# Patient Record
Sex: MALE | Race: BLACK OR AFRICAN AMERICAN | Employment: OTHER | URBAN - METROPOLITAN AREA
[De-identification: names, ages, dates, MRNs, and addresses within clinical notes are randomized per-mention and may not be internally consistent; named-entity substitution may affect disease eponyms.]

---

## 2021-06-15 ENCOUNTER — HOSPITAL ENCOUNTER (OUTPATIENT)
Age: 56
Discharge: ANOTHER HOSPITAL | End: 2021-06-15
Payer: MEDICARE

## 2021-06-15 ENCOUNTER — HOSPITAL ENCOUNTER (EMERGENCY)
Facility: HOSPITAL | Age: 56
Discharge: HOME | End: 2021-06-15
Attending: EMERGENCY MEDICINE
Payer: MEDICARE

## 2021-06-15 VITALS
DIASTOLIC BLOOD PRESSURE: 95 MMHG | OXYGEN SATURATION: 98 % | WEIGHT: 200 LBS | HEART RATE: 106 BPM | TEMPERATURE: 98 F | RESPIRATION RATE: 20 BRPM | HEIGHT: 67 IN | SYSTOLIC BLOOD PRESSURE: 166 MMHG | BODY MASS INDEX: 31.39 KG/M2

## 2021-06-15 VITALS
DIASTOLIC BLOOD PRESSURE: 100 MMHG | SYSTOLIC BLOOD PRESSURE: 166 MMHG | HEART RATE: 104 BPM | OXYGEN SATURATION: 99 % | TEMPERATURE: 99 F | HEIGHT: 67 IN | RESPIRATION RATE: 18 BRPM

## 2021-06-15 DIAGNOSIS — H53.9 VISUAL CHANGES: Primary | ICD-10-CM

## 2021-06-15 DIAGNOSIS — I10 HYPERTENSION, UNSPECIFIED TYPE: ICD-10-CM

## 2021-06-15 DIAGNOSIS — I10 HYPERTENSION, UNSPECIFIED TYPE: Primary | ICD-10-CM

## 2021-06-15 DIAGNOSIS — H43.393 VITREOUS FLOATERS OF BOTH EYES: ICD-10-CM

## 2021-06-15 PROCEDURE — 80048 BASIC METABOLIC PNL TOTAL CA: CPT | Performed by: EMERGENCY MEDICINE

## 2021-06-15 PROCEDURE — 96374 THER/PROPH/DIAG INJ IV PUSH: CPT

## 2021-06-15 PROCEDURE — 99284 EMERGENCY DEPT VISIT MOD MDM: CPT

## 2021-06-15 PROCEDURE — 85025 COMPLETE CBC W/AUTO DIFF WBC: CPT | Performed by: EMERGENCY MEDICINE

## 2021-06-15 PROCEDURE — 99205 OFFICE O/P NEW HI 60 MIN: CPT | Performed by: NURSE PRACTITIONER

## 2021-06-15 RX ORDER — HYDRALAZINE HYDROCHLORIDE 20 MG/ML
10 INJECTION INTRAMUSCULAR; INTRAVENOUS ONCE
Status: COMPLETED | OUTPATIENT
Start: 2021-06-15 | End: 2021-06-15

## 2021-06-15 RX ORDER — AMLODIPINE BESYLATE 5 MG/1
5 TABLET ORAL DAILY
Qty: 30 TABLET | Refills: 0 | Status: SHIPPED | OUTPATIENT
Start: 2021-06-15 | End: 2021-06-24

## 2021-06-15 NOTE — ED QUICK NOTES
Patient notes floaters in bilateral eyes. States he has had them intermittently over the past year, but that as of Sunday they were back. Denies any numbness or tingling. No other complaints.

## 2021-06-15 NOTE — ED QUICK NOTES
Patient safe to DC home per MD. Myers Lookout to dress self. DC teaching done, instructions reviewed with patient including when and how to follow up with healthcare providers and when to seek emergency care. The patient verbalizes understanding.  Peripheral IV disc

## 2021-06-15 NOTE — ED INITIAL ASSESSMENT (HPI)
\"seeing floaters\" intermittently in bilateral eyes since Sunday. Denies headache. Ambulating with steady gait.

## 2021-06-15 NOTE — ED PROVIDER NOTES
Patient Seen in: [facility] AND [facility] Emergency Department    History   Patient presents with: Eye Visual Problem    Stated Complaint: visual changes/HTN    HPI    Patient complains of floaters since January in both eyes. No visual changes.   No numbness ti edema  NEURO: alert and oiented *3, 2-12 intact, no focal deficit noted  SKIN: good skin turgor, no  rashes  PSYCH: calm, cooperative,    Differential includes:htn with floaters no evidence of end organ damage    ED Course     Labs Reviewed   Jasmina Salts 5:09 PM    START taking these medications    amLODIPine Besylate 5 MG Oral Tab  Take 1 tablet (5 mg total) by mouth daily. , Normal, Disp-30 tablet, R-0                           Disposition and Plan     Clinical Impression:  Hypertension, unspecified type

## 2021-06-15 NOTE — ED PROVIDER NOTES
Patient Seen in: [facility]      History   Patient presents with:   Eye Visual Problem    Stated Complaint: runny eyes/vision/runny nose    HPI/Subjective:   HPI    This is a 61 y/o male with a history of developmental delay who presents with reactive to light. Cardiovascular:      Rate and Rhythm: Normal rate and regular rhythm. Pulses: Normal pulses. Heart sounds: Normal heart sounds. Pulmonary:      Effort: Pulmonary effort is normal.      Breath sounds: Normal breath sounds.

## 2021-06-24 ENCOUNTER — OFFICE VISIT (OUTPATIENT)
Dept: FAMILY MEDICINE CLINIC | Facility: CLINIC | Age: 56
End: 2021-06-24
Payer: MEDICARE

## 2021-06-24 VITALS
SYSTOLIC BLOOD PRESSURE: 144 MMHG | WEIGHT: 206.63 LBS | HEART RATE: 92 BPM | HEIGHT: 67 IN | DIASTOLIC BLOOD PRESSURE: 84 MMHG | BODY MASS INDEX: 32.43 KG/M2

## 2021-06-24 DIAGNOSIS — Z12.5 SCREENING FOR MALIGNANT NEOPLASM OF PROSTATE: ICD-10-CM

## 2021-06-24 DIAGNOSIS — I10 ESSENTIAL HYPERTENSION: Primary | ICD-10-CM

## 2021-06-24 DIAGNOSIS — I10 ESSENTIAL HYPERTENSION: ICD-10-CM

## 2021-06-24 DIAGNOSIS — Z00.00 ADULT GENERAL MEDICAL EXAMINATION: ICD-10-CM

## 2021-06-24 DIAGNOSIS — H43.393 VITREOUS FLOATERS OF BOTH EYES: ICD-10-CM

## 2021-06-24 PROCEDURE — 99214 OFFICE O/P EST MOD 30 MIN: CPT | Performed by: NURSE PRACTITIONER

## 2021-06-24 RX ORDER — AMLODIPINE BESYLATE 10 MG/1
10 TABLET ORAL DAILY
Qty: 30 TABLET | Refills: 1 | Status: SHIPPED | OUTPATIENT
Start: 2021-06-24 | End: 2021-06-24

## 2021-06-24 RX ORDER — AMLODIPINE BESYLATE 10 MG/1
TABLET ORAL
Qty: 90 TABLET | Refills: 0 | Status: SHIPPED | OUTPATIENT
Start: 2021-06-24 | End: 2021-09-01

## 2021-06-24 NOTE — PATIENT INSTRUCTIONS
-Increase Amlodipine to 10mg daily.  You can take 2 of your 5-mg tablets at the same time daily until you run out, then start the new prescription of 10mg tablets that I sent to your pharmacy.  -Fasting labs: see attached lab sheet for available locatio cause serious health problems. It raises your risk for heart attack, stroke, and heart failure. But you can do many things to manage your blood pressure.  In general, if you have high blood pressure, keeping your blood pressure below 130/80 mmHg may help pr is an important part of any program to lower blood pressure. Learn ways to relax. These include meditation, yoga, and biofeedback. · If medicines were prescribed, take them exactly as directed.  Missing doses may cause your blood pressure to get out of con memory, simply take the monitor with you to your next appointment. · Call your provider if you have several high readings. Don't be frightened by a single high reading, but if you get several high readings, check in with your healthcare provider.   · Note: these problems. It's important to know the appropriate blood pressure range and remember to check your blood pressure regularly. Doing so can save your life. Blood pressure measurements are given as 2 numbers. Systolic blood pressure is the upper number. Generally, a good weight loss goal is to lose 10% of your body weight in a year. · Limit snacks and sweets. · Get regular exercise. Get up and get active   · Find activities you enjoy that can be done alone or with friends or family.  Such activities m

## 2021-06-24 NOTE — PROGRESS NOTES
Chief Complaint:   Patient presents with: Follow - Up: ER follow-up hypertension      HPI:   This is a 60year old male coming in for follow-up after emergency room visit on 6/15/21 for visual changes and high blood pressure.  No prior diagnosis of hyperte 150.0 - 450.0 10(3)uL    Neutrophil Absolute Prelim 8.22 (H) 1.50 - 7.70 x10 (3) uL    Neutrophil Absolute 8.22 (H) 1.50 - 7.70 x10(3) uL    Lymphocyte Absolute 1.55 1.00 - 4.00 x10(3) uL    Monocyte Absolute 0.74 0.10 - 1.00 x10(3) uL    Eosinophil Absolu following:    Height as of this encounter: 5' 7\" (1.702 m). Weight as of this encounter: 206 lb 9.6 oz (93.7 kg). Vital signs reviewed. Appears stated age, well groomed, in no acute distress.   Physical Exam:  GEN:  Patient is alert, awake and oriented

## 2021-07-07 ENCOUNTER — TELEPHONE (OUTPATIENT)
Dept: FAMILY MEDICINE CLINIC | Facility: CLINIC | Age: 56
End: 2021-07-07

## 2021-07-07 DIAGNOSIS — H43.393 VITREOUS FLOATERS OF BOTH EYES: Primary | ICD-10-CM

## 2021-07-07 NOTE — TELEPHONE ENCOUNTER
Yvette Cea calling for pt, requesting a referral to Dr. Dariel Fried, Ophthalmology. Referral pended; inform Rhys De Jesus when complete at [phone].

## 2021-07-07 NOTE — TELEPHONE ENCOUNTER
Informed Lance Anderson referral is not approved at this stage only ordered and may require approval from the health insurance provider. She verbalizes understanding.

## 2021-07-08 ENCOUNTER — TELEPHONE (OUTPATIENT)
Dept: FAMILY MEDICINE CLINIC | Facility: CLINIC | Age: 56
End: 2021-07-08

## 2021-07-08 ENCOUNTER — OFFICE VISIT (OUTPATIENT)
Dept: FAMILY MEDICINE CLINIC | Facility: CLINIC | Age: 56
End: 2021-07-08
Payer: MEDICARE

## 2021-07-08 VITALS
OXYGEN SATURATION: 98 % | SYSTOLIC BLOOD PRESSURE: 136 MMHG | HEIGHT: 67 IN | HEART RATE: 99 BPM | BODY MASS INDEX: 32.02 KG/M2 | DIASTOLIC BLOOD PRESSURE: 84 MMHG | WEIGHT: 204 LBS

## 2021-07-08 DIAGNOSIS — H43.393 VITREOUS FLOATERS OF BOTH EYES: ICD-10-CM

## 2021-07-08 DIAGNOSIS — I10 ESSENTIAL HYPERTENSION: Primary | ICD-10-CM

## 2021-07-08 PROCEDURE — 99213 OFFICE O/P EST LOW 20 MIN: CPT | Performed by: NURSE PRACTITIONER

## 2021-07-08 RX ORDER — BIMATOPROST 0.01 %
1 DROPS OPHTHALMIC (EYE) EVERY EVENING
COMMUNITY
End: 2021-08-31

## 2021-07-08 NOTE — PROGRESS NOTES
Chief Complaint:   Patient presents with:  Blood Pressure: recheck       HPI:   This is a 60year old male coming in for blood pressure follow-up. At last visit on 6/24/21 patient's blood pressure was 144/84.  Amlodipine 5 mg daily was increased to 10 mg da 7.70 x10(3) uL    Lymphocyte Absolute 1.55 1.00 - 4.00 x10(3) uL    Monocyte Absolute 0.74 0.10 - 1.00 x10(3) uL    Eosinophil Absolute 0.03 0.00 - 0.70 x10(3) uL    Basophil Absolute 0.04 0.00 - 0.20 x10(3) uL    Immature Granulocyte Absolute 0.04 0.00 - encounter: 204 lb (92.5 kg). Vital signs reviewed. Appears stated age, well groomed, in no acute distress. Physical Exam:  GEN:  Patient is alert, awake and oriented, well developed, well nourished.   SKIN: No rashes, no skin lesion, no bruising, good tur

## 2021-07-08 NOTE — TELEPHONE ENCOUNTER
Referral for Dr. Yosi Egan (retinal specialist) was authorized that was placed 6/24/21. Patient had an appointment this morning that he had to cancel because this provider's office did not receive referral from [facility].  Can we please fax the referral author

## 2021-07-12 ENCOUNTER — TELEPHONE (OUTPATIENT)
Dept: FAMILY MEDICINE CLINIC | Facility: CLINIC | Age: 56
End: 2021-07-12

## 2021-07-22 ENCOUNTER — OFFICE VISIT (OUTPATIENT)
Dept: FAMILY MEDICINE CLINIC | Facility: CLINIC | Age: 56
End: 2021-07-22
Payer: MEDICARE

## 2021-07-22 VITALS
DIASTOLIC BLOOD PRESSURE: 82 MMHG | HEIGHT: 67 IN | BODY MASS INDEX: 32.08 KG/M2 | OXYGEN SATURATION: 97 % | WEIGHT: 204.38 LBS | HEART RATE: 100 BPM | SYSTOLIC BLOOD PRESSURE: 118 MMHG

## 2021-07-22 DIAGNOSIS — R51.9 PRESSURE IN HEAD: Primary | ICD-10-CM

## 2021-07-22 DIAGNOSIS — I10 ESSENTIAL HYPERTENSION: ICD-10-CM

## 2021-07-22 PROCEDURE — 99213 OFFICE O/P EST LOW 20 MIN: CPT | Performed by: NURSE PRACTITIONER

## 2021-07-22 RX ORDER — LATANOPROST 50 UG/ML
SOLUTION/ DROPS OPHTHALMIC
COMMUNITY
Start: 2021-07-15

## 2021-07-22 RX ORDER — DORZOLAMIDE HYDROCHLORIDE AND TIMOLOL MALEATE 20; 5 MG/ML; MG/ML
1 SOLUTION/ DROPS OPHTHALMIC 2 TIMES DAILY
COMMUNITY
Start: 2021-07-12

## 2021-07-22 NOTE — PROGRESS NOTES
Chief Complaint:   Patient presents with:  Lightheadedness      HPI:   This is a 60year old male coming in with sister for complaint of intermittent \"strange sensation\" in forehead/bilateral temporal area that started on Monday.  He has difficulty descri Range    Hold Gold Auto Resulted    CBC W/ DIFFERENTIAL   Result Value Ref Range    WBC 10.6 4.0 - 11.0 x10(3) uL    RBC 5.41 4.30 - 5.70 x10(6)uL    HGB 16.7 13.0 - 17.5 g/dL    HCT 50.7 39.0 - 53.0 %    MCV 93.7 80.0 - 100.0 fL    MCH 30.9 26.0 - 34.0 pg Solution Place 1 drop into the right eye every evening.      • AMLODIPINE BESYLATE 10 MG Oral Tab TAKE 1 TABLET(10 MG) BY MOUTH DAILY 90 tablet 0      Counseling given: Not Answered       REVIEW OF SYSTEMS:   CONSTITUTIONAL:  Denies unusual weight gain/loss unclear, has difficulty describing symptoms.  -Symptoms not reproducible on exam.   -Instructed patient to call opthalmologist to report his symptoms to see if a follow-up or change in therapy is indicated.   -S/S to go to ER with discussed including severe

## 2021-07-23 ENCOUNTER — APPOINTMENT (OUTPATIENT)
Dept: MRI IMAGING | Facility: HOSPITAL | Age: 56
End: 2021-07-23
Attending: EMERGENCY MEDICINE
Payer: MEDICARE

## 2021-07-23 ENCOUNTER — HOSPITAL ENCOUNTER (EMERGENCY)
Facility: HOSPITAL | Age: 56
Discharge: HOME | End: 2021-07-23
Attending: EMERGENCY MEDICINE
Payer: MEDICARE

## 2021-07-23 VITALS
SYSTOLIC BLOOD PRESSURE: 144 MMHG | BODY MASS INDEX: 32 KG/M2 | TEMPERATURE: 98 F | DIASTOLIC BLOOD PRESSURE: 83 MMHG | OXYGEN SATURATION: 98 % | HEART RATE: 90 BPM | RESPIRATION RATE: 16 BRPM | WEIGHT: 204 LBS

## 2021-07-23 DIAGNOSIS — R42 VERTIGO: Primary | ICD-10-CM

## 2021-07-23 PROCEDURE — 99284 EMERGENCY DEPT VISIT MOD MDM: CPT

## 2021-07-23 PROCEDURE — 70551 MRI BRAIN STEM W/O DYE: CPT | Performed by: EMERGENCY MEDICINE

## 2021-07-23 RX ORDER — MECLIZINE HYDROCHLORIDE 25 MG/1
25 TABLET ORAL 3 TIMES DAILY PRN
Qty: 20 TABLET | Refills: 0 | Status: SHIPPED | OUTPATIENT
Start: 2021-07-23 | End: 2021-12-09

## 2021-07-23 NOTE — ED QUICK NOTES
Weston Schulz is here for evaluation of episodes of frontal head pressure intermittently for past five days. Denies pain, denies visual disturbance, denies focal weakness.   He has a history of glaucoma and recently prescribed lumigan and combigan eye drops by op

## 2021-07-24 NOTE — ED PROVIDER NOTES
Patient Seen in: [facility] [place] AND [facility] Emergency Department      History   No chief complaint on file. Stated Complaint: Headache    HPI/Subjective:   HPI    64-year-old male presents for evaluation of headache and vertigo.   Patient reports over the past rhythm. Heart sounds: Normal heart sounds. Pulmonary:      Effort: Pulmonary effort is normal. No respiratory distress. Breath sounds: Normal breath sounds. Abdominal:      General: Bowel sounds are normal.      Palpations: Abdomen is soft. [address]  [phone]    Schedule an appointment as soon as possible for a visit in 3 days  For follow up          Medications Prescribed:  Discharge Medication List as of 7/23/2021  2:42 PM    START taking these medications    Meclizine HCl 25 MG Oral Tab

## 2021-07-28 ENCOUNTER — OFFICE VISIT (OUTPATIENT)
Dept: FAMILY MEDICINE CLINIC | Facility: CLINIC | Age: 56
End: 2021-07-28
Payer: MEDICARE

## 2021-07-28 VITALS
DIASTOLIC BLOOD PRESSURE: 90 MMHG | SYSTOLIC BLOOD PRESSURE: 130 MMHG | HEIGHT: 67 IN | BODY MASS INDEX: 31.89 KG/M2 | WEIGHT: 203.19 LBS

## 2021-07-28 DIAGNOSIS — R51.9 PRESSURE IN HEAD: Primary | ICD-10-CM

## 2021-07-28 DIAGNOSIS — R42 LIGHTHEADEDNESS: ICD-10-CM

## 2021-07-28 DIAGNOSIS — I10 ESSENTIAL HYPERTENSION: ICD-10-CM

## 2021-07-28 DIAGNOSIS — H53.9 VISION CHANGES: ICD-10-CM

## 2021-07-28 PROCEDURE — 99213 OFFICE O/P EST LOW 20 MIN: CPT | Performed by: NURSE PRACTITIONER

## 2021-07-28 NOTE — PROGRESS NOTES
Chief Complaint:   Patient presents with:  ER F/U: having dizziness and headache in temporal region  Vertigo      HPI:   This is a 60year old male coming in for ER follow-up.  Patient was in the ER on 7/23/21 with complaint of a \"strange sensation\" in fo reviewed on smear    RAINBOW DRAW LAVENDER   Result Value Ref Range    Hold Lavender Auto Resulted    RAINBOW DRAW LIGHT GREEN   Result Value Ref Range    Hold Lt Green Auto Resulted    RAINBOW DRAW GOLD   Result Value Ref Range    Hold Gold Auto Resulted Dorzolamide HCl-Timolol Mal 22.3-6.8 MG/ML Ophthalmic Solution Place 1 drop into both eyes 2 (two) times daily.        • latanoprost 0.005 % Ophthalmic Solution INSTILL 1 DROP BOTH EYES EVERY DAY AT BEDTIME AS DIRECTED     • Brimonidine Tartrate-Timolol 0.2 erythema or exudate. Mouth:  No oral lesions, good dentition. SKIN: No rashes, no skin lesion, no bruising, good turgor. HEART:  Regular rate and rhythm, no murmurs, rubs or gallops. LUNGS: Clear to auscultation bilterally, no rales/rhonchi/wheezing.

## 2021-08-21 ENCOUNTER — IMMUNIZATION (OUTPATIENT)
Dept: LAB | Facility: HOSPITAL | Age: 56
End: 2021-08-21
Attending: EMERGENCY MEDICINE
Payer: MEDICARE

## 2021-08-21 DIAGNOSIS — Z23 NEED FOR VACCINATION: Primary | ICD-10-CM

## 2021-08-21 PROCEDURE — 0001A SARSCOV2 VAC 30MCG/0.3ML IM: CPT

## 2021-08-25 ENCOUNTER — HOSPITAL ENCOUNTER (EMERGENCY)
Facility: HOSPITAL | Age: 56
Discharge: HOME | End: 2021-08-25
Attending: EMERGENCY MEDICINE
Payer: MEDICARE

## 2021-08-25 ENCOUNTER — HOSPITAL ENCOUNTER (OUTPATIENT)
Dept: ULTRASOUND IMAGING | Facility: HOSPITAL | Age: 56
Discharge: HOME | End: 2021-08-25
Attending: NURSE PRACTITIONER
Payer: MEDICARE

## 2021-08-25 VITALS
BODY MASS INDEX: 32 KG/M2 | OXYGEN SATURATION: 99 % | WEIGHT: 202 LBS | TEMPERATURE: 98 F | HEART RATE: 86 BPM | DIASTOLIC BLOOD PRESSURE: 87 MMHG | SYSTOLIC BLOOD PRESSURE: 135 MMHG | RESPIRATION RATE: 18 BRPM

## 2021-08-25 DIAGNOSIS — R51.9 PRESSURE IN HEAD: ICD-10-CM

## 2021-08-25 DIAGNOSIS — H53.9 VISION CHANGES: ICD-10-CM

## 2021-08-25 DIAGNOSIS — R42 LIGHTHEADEDNESS: ICD-10-CM

## 2021-08-25 DIAGNOSIS — I10 ESSENTIAL HYPERTENSION: ICD-10-CM

## 2021-08-25 DIAGNOSIS — R42 DIZZINESS: Primary | ICD-10-CM

## 2021-08-25 PROCEDURE — 93880 EXTRACRANIAL BILAT STUDY: CPT | Performed by: NURSE PRACTITIONER

## 2021-08-25 PROCEDURE — 99282 EMERGENCY DEPT VISIT SF MDM: CPT

## 2021-08-25 NOTE — ED PROVIDER NOTES
Patient Seen in: [facility] AND [facility] Emergency Department      History   Patient presents with:  Headache    Stated Complaint: off balance, HA x 1 month, sent by [facility]- unable to complete d/t new onset of sym*    HPI/Subjective:   HPI    Patient is a 56-year nystagmus, TMs clear  Neck: supple, non tender, no meningeal signs  CV: RRR, no murmurs  Resp: CTAB, no wheezes or retractions  Extremities: FROM of all extremities, no cyanosis/clubbing/edema  Neuro: CN intact, normal speech, normal gait, negative romberg

## 2021-08-31 ENCOUNTER — TELEPHONE (OUTPATIENT)
Dept: NEUROLOGY | Facility: CLINIC | Age: 56
End: 2021-08-31

## 2021-08-31 ENCOUNTER — OFFICE VISIT (OUTPATIENT)
Dept: NEUROLOGY | Facility: CLINIC | Age: 56
End: 2021-08-31
Payer: MEDICARE

## 2021-08-31 VITALS
HEIGHT: 69 IN | HEART RATE: 84 BPM | WEIGHT: 204 LBS | SYSTOLIC BLOOD PRESSURE: 126 MMHG | BODY MASS INDEX: 30.21 KG/M2 | DIASTOLIC BLOOD PRESSURE: 86 MMHG

## 2021-08-31 DIAGNOSIS — M54.2 CERVICALGIA: ICD-10-CM

## 2021-08-31 DIAGNOSIS — R26.81 GAIT INSTABILITY: ICD-10-CM

## 2021-08-31 DIAGNOSIS — Z82.49 FAMILY HISTORY OF ANEURYSM OF BLOOD VESSEL OF BRAIN: ICD-10-CM

## 2021-08-31 DIAGNOSIS — G44.201 ACUTE INTRACTABLE TENSION-TYPE HEADACHE: Primary | ICD-10-CM

## 2021-08-31 PROCEDURE — 99203 OFFICE O/P NEW LOW 30 MIN: CPT | Performed by: OTHER

## 2021-08-31 NOTE — PROGRESS NOTES
Leigh Ann Dub [address]  [address] [facility], [address] [place] [address]  [phone]              Date: August 31, 2021  Patient Name: Alexandro Fairbanks   MRN: [id number]    Reason for Evaluation: head pressure    HPI:     Alexandro Fairbanks is a 60 times daily as needed for Dizziness. , Disp: 20 tablet, Rfl: 0  Dorzolamide HCl-Timolol Mal 22.3-6.8 MG/ML Ophthalmic Solution, Place 1 drop into both eyes 2 (two) times daily.   , Disp: , Rfl:   latanoprost 0.005 % Ophthalmic Solution, INSTILL 1 DROP BOTH E CN VIII: auditory acuity intact to bedside testing  CN IX/CN X: normal palate elevation  CN XI: normal strength of trapezius and SCM muscles bilaterally. CN XII: tongue protrudes in the midline, no atrophy or fasciculations.     Motor Exam:   Muscle Bulk: Absolute      0.00 - 0.70 x10(3) uL 0.03   Basophils Absolute      0.00 - 0.20 x10(3) uL 0.04   Immature Granulocyte Absolute      0.00 - 1.00 x10(3) uL 0.04   Neutrophils %      % 77.3   Lymphocytes %      % 14.6   Monocytes %      % 7.0   Eosinophils % motion degradation. Risks vs benefit of CT imaging was discussed with patient and his sister.   His neurological examination is significant for slightly reduced fund of knowledge but otherwise nonfocal, consistent with his history of developmental delay an

## 2021-08-31 NOTE — TELEPHONE ENCOUNTER
Minda for authorization of approval for CTA BRAIN (XBF=[id number])    Authorization # [id number] effective 08/31/21 to 09/29/21. Minda for authorization of approval for MRI C-spine wo cpt code 55021.    Authorization # [id number]

## 2021-08-31 NOTE — PATIENT INSTRUCTIONS
-CT cervical spine and CTA brain   -Physical therapy consult for neck pain and headaches   -Follow up in 1 month or sooner if needed.     -If you develop sudden onset loss of vision, double vision, room spinning/world spinning sensation, inability to speak

## 2021-09-01 ENCOUNTER — OFFICE VISIT (OUTPATIENT)
Dept: FAMILY MEDICINE CLINIC | Facility: CLINIC | Age: 56
End: 2021-09-01
Payer: MEDICARE

## 2021-09-01 VITALS
DIASTOLIC BLOOD PRESSURE: 74 MMHG | OXYGEN SATURATION: 97 % | HEIGHT: 69 IN | HEART RATE: 86 BPM | BODY MASS INDEX: 29.65 KG/M2 | WEIGHT: 200.19 LBS | SYSTOLIC BLOOD PRESSURE: 130 MMHG

## 2021-09-01 DIAGNOSIS — I10 ESSENTIAL HYPERTENSION: ICD-10-CM

## 2021-09-01 DIAGNOSIS — R20.2 NUMBNESS AND TINGLING IN RIGHT HAND: ICD-10-CM

## 2021-09-01 DIAGNOSIS — R20.0 NUMBNESS AND TINGLING IN RIGHT HAND: ICD-10-CM

## 2021-09-01 DIAGNOSIS — R51.9 PRESSURE IN HEAD: Primary | ICD-10-CM

## 2021-09-01 DIAGNOSIS — M54.2 CERVICALGIA: ICD-10-CM

## 2021-09-01 PROCEDURE — 99213 OFFICE O/P EST LOW 20 MIN: CPT | Performed by: NURSE PRACTITIONER

## 2021-09-01 RX ORDER — AMLODIPINE BESYLATE 10 MG/1
10 TABLET ORAL DAILY
Qty: 90 TABLET | Refills: 1 | Status: SHIPPED | OUTPATIENT
Start: 2021-09-01 | End: 2021-12-09

## 2021-09-01 NOTE — PATIENT INSTRUCTIONS
Quadruped Arm-Reach Exercise       Do this exercise on your hands and knees. Keep your knees under your hips and your hands under your shoulders. Keep your spine in a neutral position (not arched or sagging). Keep your ears in line with your shoulders.  H elbows toward your buttocks. Hold for up to 5 seconds, then return to starting position.   · Repeat 3 times, or as directed by your healthcare provider or physical therapist.  Shayla Butler last reviewed this educational content on 7/1/2020  © [id number] [employer] professional's instructions. Neck Exercises: Active Neck Rotation    To start, lie on your back, knees bent and feet flat on the floor. Keep your ears, shoulders, and hips aligned, but don’t press your lower back to the floor.  Rest your hands on you Neck Exercises: Head Lifts    Do this exercise on your hands and knees. Keep your knees under your hips and your hands under your shoulders. Keep your spine in a neutral position (not arched or sagging). Keep your ears in line with your shoulders.  Daysi Jalloh back. Place your right palm on the top of your head. · Gently pull your head forward and down until you feel a stretch in the muscles in the back of your neck. Don’t force the motion. · Hold for 20 seconds, then return to starting position. Switch arms.

## 2021-09-01 NOTE — PROGRESS NOTES
Chief Complaint:   Patient presents with: Follow - Up: pressure in head   Numbness: R hand - shocked by phone       HPI:   This is a 60year old male coming in for follow-up. Presents with sister.  Patient continues to have pressure in head, episodic, occu RAINBOW DRAW LAVENDER   Result Value Ref Range    Hold Lavender Auto Resulted    RAINBOW DRAW LIGHT GREEN   Result Value Ref Range    Hold Lt Green Auto Resulted    RAINBOW DRAW GOLD   Result Value Ref Range    Hold Gold Auto Resulted    CBC W/ DIFFEREN Tab Take 1 tablet (25 mg total) by mouth 3 (three) times daily as needed for Dizziness. 20 tablet 0   • Dorzolamide HCl-Timolol Mal 22.3-6.8 MG/ML Ophthalmic Solution Place 1 drop into both eyes 2 (two) times daily.        • latanoprost 0.005 % Ophthalmic S and tone, sensory intact. ASSESSMENT AND PLAN:   1. Pressure in head  -Unclear, followed by neuro and planned for CT head/neck and PT. S/S to go to ER with reviewed. 2. Essential hypertension  -BP stable, will CPM and refill amlodipine.    - amLODIPi

## 2021-09-10 ENCOUNTER — HOSPITAL ENCOUNTER (OUTPATIENT)
Dept: CT IMAGING | Facility: HOSPITAL | Age: 56
Discharge: HOME | End: 2021-09-10
Attending: Other
Payer: MEDICARE

## 2021-09-10 DIAGNOSIS — G44.201 ACUTE INTRACTABLE TENSION-TYPE HEADACHE: ICD-10-CM

## 2021-09-10 DIAGNOSIS — M54.2 CERVICALGIA: ICD-10-CM

## 2021-09-10 LAB — CREAT BLD-MCNC: 1.2 MG/DL

## 2021-09-10 PROCEDURE — 70496 CT ANGIOGRAPHY HEAD: CPT | Performed by: OTHER

## 2021-09-10 PROCEDURE — 82565 ASSAY OF CREATININE: CPT

## 2021-09-10 PROCEDURE — 72125 CT NECK SPINE W/O DYE: CPT | Performed by: OTHER

## 2021-09-11 ENCOUNTER — IMMUNIZATION (OUTPATIENT)
Dept: LAB | Facility: HOSPITAL | Age: 56
End: 2021-09-11
Attending: EMERGENCY MEDICINE
Payer: MEDICARE

## 2021-09-11 DIAGNOSIS — Z23 NEED FOR VACCINATION: Primary | ICD-10-CM

## 2021-09-11 PROCEDURE — 0002A SARSCOV2 VAC 30MCG/0.3ML IM: CPT

## 2021-09-13 ENCOUNTER — TELEPHONE (OUTPATIENT)
Dept: NEUROLOGY | Facility: CLINIC | Age: 56
End: 2021-09-13

## 2021-09-14 ENCOUNTER — TELEPHONE (OUTPATIENT)
Dept: PHYSICAL THERAPY | Facility: HOSPITAL | Age: 56
End: 2021-09-14

## 2021-09-14 NOTE — TELEPHONE ENCOUNTER
Spoke to patient's sister and reviewed Dr. Doreen Kennedy message below. She was understanding and will call PT today to get his scheduled. He is scheduled for a follow-up on 10/5/21. She was very thankful for the call.

## 2021-09-14 NOTE — TELEPHONE ENCOUNTER
, Roberto Calvin, DO  You; Solis Uriarte Nurse 19 minutes ago (7:35 AM)     SS    Thanks!  Please let him know that the blood vessels of his brain look normal. 2 of them are slightly smaller than expected but this is a normal variant, meaning it was something he

## 2021-09-14 NOTE — TELEPHONE ENCOUNTER
I also wanted to mention that the severe narrowing on the right C4 foramen means he likely has a pinched nerve here, which can lead to not only neck pain but arm symptoms such as numbness or weakness.  If he is experiencing this, physical therapy can still

## 2021-09-15 ENCOUNTER — OFFICE VISIT (OUTPATIENT)
Dept: PHYSICAL THERAPY | Facility: HOSPITAL | Age: 56
End: 2021-09-15
Attending: Other
Payer: MEDICARE

## 2021-09-15 DIAGNOSIS — M54.2 CERVICALGIA: ICD-10-CM

## 2021-09-15 PROCEDURE — 97161 PT EVAL LOW COMPLEX 20 MIN: CPT

## 2021-09-15 PROCEDURE — 97110 THERAPEUTIC EXERCISES: CPT

## 2021-09-15 NOTE — PROGRESS NOTES
SPINE EVALUATION:   Referring Physician: Dr. Jone Berger  Diagnosis: Cervicalgia (M54.2)     Date of Service: 9/15/2021     PATIENT SUMMARY   Melia Leone is a 60year old male who presents to therapy today with complaints of bilateral (R>L) head discomfort, Had been hypertensive at some physician visits. Has recently started going back to doctor. History of glaucoma. Developmental delay. Patient states that he feels he is healthy. Walks frequently for exercise as he does not drive.        No past surgical denotes performed with pain)  UE/Scapular   Shoulder Elevation (C4): R 4/5, L 5/5  Shoulder ABD (C5): R 5/5, L 5/5  Biceps (C6): R 5/5, L 5/5  Wrist ext (C6): R 4+/5, L 5/5  Triceps (C7): R 5/5, L 5/5  Wrist Flex (C7): R 4+/5, L 5/5  Digit Flex (C8): R 4+/ compliant with comprehensive HEP to maintain progress achieved in PT       Frequency / Duration: Patient will be seen for 1-2 x/week or a total of 10 visits over a 90 day period.  Treatment will include: Manual Therapy, Neuromuscular Re-education, Therapeut

## 2021-09-16 ENCOUNTER — OFFICE VISIT (OUTPATIENT)
Dept: PHYSICAL THERAPY | Facility: HOSPITAL | Age: 56
End: 2021-09-16
Attending: Other
Payer: MEDICARE

## 2021-09-16 DIAGNOSIS — M54.2 CERVICALGIA: ICD-10-CM

## 2021-09-16 PROCEDURE — 97140 MANUAL THERAPY 1/> REGIONS: CPT

## 2021-09-16 PROCEDURE — 97110 THERAPEUTIC EXERCISES: CPT

## 2021-09-16 NOTE — PROGRESS NOTES
Dx: Cervicalgia (M54.2)         Insurance (Authorized # of Visits):  Harshad Caller 2/10           Authorizing Physician: Dr. Leonardo Ashby  Next MD visit: none scheduled  Fall Risk: standard         Precautions: n/a             Subjective: Neck stretches are going oka instruction  Date: 9/16/2021  TX#: 2/10 Date:                 TX#: 3/ Date:                 TX#: 4/ Date:                 TX#: 5/ Date:    Tx#: 6/   MT:   LAT   PPIVM rotation to open R side low cervical   Rib 1 inferior mobilization  15 min       TE:   Yara

## 2021-09-17 ENCOUNTER — TELEPHONE (OUTPATIENT)
Dept: FAMILY MEDICINE CLINIC | Facility: CLINIC | Age: 56
End: 2021-09-17

## 2021-09-20 ENCOUNTER — OFFICE VISIT (OUTPATIENT)
Dept: PHYSICAL THERAPY | Facility: HOSPITAL | Age: 56
End: 2021-09-20
Attending: Other
Payer: MEDICARE

## 2021-09-20 PROCEDURE — 97140 MANUAL THERAPY 1/> REGIONS: CPT

## 2021-09-20 PROCEDURE — 97110 THERAPEUTIC EXERCISES: CPT

## 2021-09-20 NOTE — PROGRESS NOTES
Dx: Cervicalgia (M54.2)         Insurance (Authorized # of Visits):  Brent Muhammad 3/10           Authorizing Physician: Dr. Kika Castillo  Next MD visit: none scheduled  Fall Risk: standard         Precautions: n/a             Subjective:   Currently feeling head and Neuromuscular Re-education, Therapeutic Activities, Therapeutic Exercise and Home Exercise Program instruction  Date: 9/16/2021  TX#: 2/10 Date:  9/20/2021                TX#: 3/10 Date:                 TX#: 4/ Date:                 TX#: 5/ Date:    Tx#: 6/

## 2021-09-30 ENCOUNTER — OFFICE VISIT (OUTPATIENT)
Dept: PHYSICAL THERAPY | Facility: HOSPITAL | Age: 56
End: 2021-09-30
Attending: Other
Payer: MEDICARE

## 2021-09-30 PROCEDURE — 97140 MANUAL THERAPY 1/> REGIONS: CPT

## 2021-09-30 PROCEDURE — 97110 THERAPEUTIC EXERCISES: CPT

## 2021-09-30 NOTE — PROGRESS NOTES
Dx: Cervicalgia (M54.2)         Insurance (Authorized # of Visits):  Brent Muhammad 4/10           Authorizing Physician: Dr. Kika Castillo  Next MD visit: none scheduled  Fall Risk: standard         Precautions: developmental delay/communication with assistance from s independent and compliant with comprehensive HEP to maintain progress achieved in PT       Plan:   Patient will be seen for 1-2 x/week or a total of 10 visits over a 90 day period.  Treatment will include: Manual Therapy, Neuromuscular Re-education, Therape

## 2021-10-05 ENCOUNTER — OFFICE VISIT (OUTPATIENT)
Dept: NEUROLOGY | Facility: CLINIC | Age: 56
End: 2021-10-05
Payer: MEDICARE

## 2021-10-05 VITALS
BODY MASS INDEX: 31.71 KG/M2 | HEIGHT: 67 IN | DIASTOLIC BLOOD PRESSURE: 80 MMHG | SYSTOLIC BLOOD PRESSURE: 118 MMHG | WEIGHT: 202 LBS | HEART RATE: 70 BPM

## 2021-10-05 DIAGNOSIS — M54.12 CERVICAL RADICULOPATHY: Primary | ICD-10-CM

## 2021-10-05 DIAGNOSIS — H34.12 CENTRAL RETINAL ARTERY OCCLUSION OF LEFT EYE: ICD-10-CM

## 2021-10-05 PROCEDURE — 99214 OFFICE O/P EST MOD 30 MIN: CPT | Performed by: OTHER

## 2021-10-05 RX ORDER — ASPIRIN 81 MG/1
81 TABLET, CHEWABLE ORAL DAILY
Qty: 90 TABLET | Refills: 0 | Status: SHIPPED | OUTPATIENT
Start: 2021-10-05 | End: 2022-01-03

## 2021-10-05 NOTE — PROGRESS NOTES
Leigh Ann Dub 37  [address] [facility], [address] [place] [address]  [phone]          Established  Follow Up Visit       Date: October 5, 2021  Patient Name: Angelique Richards   MRN: [id number]  Primary physician: [address] EXAM:     /80 (BP Location: Right arm, Patient Position: Sitting, Cuff Size: adult)   Pulse 70   Ht 67\"   Wt 202 lb (91.6 kg)   BMI 31.64 kg/m²   General appearance: Well appearing, and in no acute distress  Skin: skin color, texture normal.  No jeni central retinal artery occlusion  --Aspirin 81 mg daily for secondary stroke prevention, risk vs benefit discussed including bleeding risk   --Follow up LDL, Hemoglobin A1C     Discussed indication, administration, dose, and side effects with patient of an

## 2021-10-07 ENCOUNTER — OFFICE VISIT (OUTPATIENT)
Dept: PHYSICAL THERAPY | Facility: HOSPITAL | Age: 56
End: 2021-10-07
Attending: Other
Payer: MEDICARE

## 2021-10-07 PROCEDURE — 97140 MANUAL THERAPY 1/> REGIONS: CPT

## 2021-10-07 PROCEDURE — 97110 THERAPEUTIC EXERCISES: CPT

## 2021-10-07 NOTE — PROGRESS NOTES
Dx: Cervicalgia (M54.2)         Insurance (Authorized # of Visits):  Juana Rosa 5/10           Authorizing Physician: Dr. Cris Matta  Next MD visit: none scheduled  Fall Risk: standard         Precautions: developmental delay/communication with assistance from s upright posturing and decreased pain    · Pt will be independent and compliant with comprehensive HEP to maintain progress achieved in PT       Plan:   Patient will be seen for 1-2 x/week or a total of 10 visits over a 90 day period.  Treatment will include

## 2021-10-13 ENCOUNTER — OFFICE VISIT (OUTPATIENT)
Dept: PHYSICAL THERAPY | Facility: HOSPITAL | Age: 56
End: 2021-10-13
Attending: Other
Payer: MEDICARE

## 2021-10-13 PROCEDURE — 97140 MANUAL THERAPY 1/> REGIONS: CPT

## 2021-10-13 PROCEDURE — 97110 THERAPEUTIC EXERCISES: CPT

## 2021-10-13 NOTE — PROGRESS NOTES
Dx: Cervicalgia (M54.2)         Insurance (Authorized # of Visits):  Heike Goldstein 6/10           Authorizing Physician: Dr. Camila Brown  Next MD visit: none scheduled  Fall Risk: standard         Precautions: developmental delay/communication with assistance from s posturing and decreased pain    · Pt will report decreased frequency of headaches to <3x/week  · Pt will demonstrate improved cervical intrinsic strength to allow improved cervical stabilization with overhead reaching   · Pt will improve postural strength rolls, sideflexion AROM stretch  TE:   DNF chin tuck x15   Band horizontal abduction YTB 2x10  D2 flexion/extension 3x10 bilat, initial cue to min shoulder hike, incorporate cervical rotation  Shoulder flexion YTB for ER 3x10      TE:   Prone scap retracti

## 2021-10-20 ENCOUNTER — APPOINTMENT (OUTPATIENT)
Dept: PHYSICAL THERAPY | Facility: HOSPITAL | Age: 56
End: 2021-10-20
Attending: Other
Payer: MEDICARE

## 2021-10-21 ENCOUNTER — APPOINTMENT (OUTPATIENT)
Dept: PHYSICAL THERAPY | Facility: HOSPITAL | Age: 56
End: 2021-10-21
Attending: NURSE PRACTITIONER
Payer: MEDICARE

## 2021-10-26 ENCOUNTER — APPOINTMENT (OUTPATIENT)
Dept: PHYSICAL THERAPY | Facility: HOSPITAL | Age: 56
End: 2021-10-26
Attending: NURSE PRACTITIONER
Payer: MEDICARE

## 2021-10-27 ENCOUNTER — OFFICE VISIT (OUTPATIENT)
Dept: PHYSICAL THERAPY | Facility: HOSPITAL | Age: 56
End: 2021-10-27
Attending: Other
Payer: MEDICARE

## 2021-10-27 PROCEDURE — 97110 THERAPEUTIC EXERCISES: CPT

## 2021-10-27 NOTE — PROGRESS NOTES
Dx: Cervicalgia (M54.2)         Insurance (Authorized # of Visits):  Thomas Canseco 6/10           Authorizing Physician: Dr. Arleth Moreno  Next MD visit: none scheduled  Fall Risk: standard         Precautions: developmental delay/communication with assistance from s turning head to check blind spot while driving  · Pt will have improved thoracic PA mobility to WNL to improve cervical ROM as well as promote upright posturing and decreased pain    · Pt will report decreased frequency of headaches to <3x/week  · Pt will bilateral  Cervical AROM rotation x20 bilateral   Shoulder flexion stretch supine x15, 5s holds   Rows GTB 3x10   D2 flexion/extension YTB 2x10 bilateral, tactile cue to min shoulder hike  Neck rolls, sideflexion AROM stretch  TE:   DNF chin tuck x15   Ban

## 2021-10-28 ENCOUNTER — APPOINTMENT (OUTPATIENT)
Dept: PHYSICAL THERAPY | Facility: HOSPITAL | Age: 56
End: 2021-10-28
Attending: NURSE PRACTITIONER
Payer: MEDICARE

## 2021-11-05 ENCOUNTER — TELEPHONE (OUTPATIENT)
Dept: FAMILY MEDICINE CLINIC | Facility: CLINIC | Age: 56
End: 2021-11-05

## 2021-11-23 ENCOUNTER — OFFICE VISIT (OUTPATIENT)
Dept: PHYSICAL THERAPY | Facility: HOSPITAL | Age: 56
End: 2021-11-23
Attending: Other
Payer: MEDICARE

## 2021-11-23 PROCEDURE — 97110 THERAPEUTIC EXERCISES: CPT

## 2021-11-23 NOTE — PROGRESS NOTES
Bryanna  Pt has attended 8 visits in Physical Therapy.      Dx: Cervicalgia (M54.2)         Insurance (Authorized # of Visits):  Heike Goldstein 8/10           Authorizing Physician: Dr. Camila Brown  Next MD visit: none scheduled  Fall Risk: standard Assessment:  Difficult to quantify changes in QOL and function, as well as changes in symptom presentation regarding patient's head and neck symptoms from communication deficits.  However, they still persist to a degree that the patient states being u improved cervical intrinsic strength to allow improved cervical stabilization with overhead reaching . poor  · Pt will improve postural strength (mid/low trap) to promote improved upright posturing and decreased pain  .  met  · Pt will be independent and co x15  Retraction overpressure seated x15  Flexion weighted 7# 2x10  Rows blue tb  TE:   Chin tuck with manual resistance x15  4 finger head turns x10  Overhead press 8# 2x10  Horiz abd GTB 3x10   Blue TB rows 3x10    TE:   Test and reassessment   Discussion

## 2021-12-01 ENCOUNTER — HOSPITAL ENCOUNTER (EMERGENCY)
Facility: HOSPITAL | Age: 56
Discharge: HOME | End: 2021-12-01
Attending: EMERGENCY MEDICINE
Payer: MEDICARE

## 2021-12-01 VITALS
WEIGHT: 196 LBS | OXYGEN SATURATION: 97 % | TEMPERATURE: 98 F | DIASTOLIC BLOOD PRESSURE: 79 MMHG | HEIGHT: 66 IN | HEART RATE: 78 BPM | RESPIRATION RATE: 18 BRPM | SYSTOLIC BLOOD PRESSURE: 135 MMHG | BODY MASS INDEX: 31.5 KG/M2

## 2021-12-01 DIAGNOSIS — R10.9 ABDOMINAL PAIN OF UNKNOWN ETIOLOGY: Primary | ICD-10-CM

## 2021-12-01 PROCEDURE — 81003 URINALYSIS AUTO W/O SCOPE: CPT | Performed by: EMERGENCY MEDICINE

## 2021-12-01 PROCEDURE — 36415 COLL VENOUS BLD VENIPUNCTURE: CPT

## 2021-12-01 PROCEDURE — 93005 ELECTROCARDIOGRAM TRACING: CPT

## 2021-12-01 PROCEDURE — 80053 COMPREHEN METABOLIC PANEL: CPT | Performed by: EMERGENCY MEDICINE

## 2021-12-01 PROCEDURE — 85025 COMPLETE CBC W/AUTO DIFF WBC: CPT | Performed by: EMERGENCY MEDICINE

## 2021-12-01 PROCEDURE — 93010 ELECTROCARDIOGRAM REPORT: CPT | Performed by: EMERGENCY MEDICINE

## 2021-12-01 PROCEDURE — 99283 EMERGENCY DEPT VISIT LOW MDM: CPT

## 2021-12-01 NOTE — ED NOTES
Received pt a/ox4, clear speech, nad, no resp distress, ambulatory with steady gait  Here with c/o epigastric abd pain x 2 days  Denies n/v/d. Denies fevers or cough. Last bowel movement on Monday    Placed on all continuous monitors.    20G piv placed to L

## 2021-12-02 NOTE — ED PROVIDER NOTES
Patient Seen in: [facility] Emergency Department    History   Patient presents with:  Abdominal Pain      HPI    Patient presents to the ED complaining of epigastric abdominal pain for the past several days.   He states pain comes and goes, denies any equipment used during my examination was cleaned with super sani-cloth germicidal wipes following the exam.     Physical Exam  Vitals and nursing note reviewed. Constitutional:       General: He is not in acute distress.      Appearance: He is well-de noted on EKG Report. Rate: Normal  Rhythm: Sinus Rhythm  Reading: Normal intervals, normal EKG             Imaging Results Available and Reviewed while in ED: No results found.   ED Medications Administered: Medications - No data to display      MDM      1

## 2021-12-06 ENCOUNTER — LAB ENCOUNTER (OUTPATIENT)
Dept: LAB | Facility: HOSPITAL | Age: 56
End: 2021-12-06
Attending: NURSE PRACTITIONER
Payer: MEDICARE

## 2021-12-06 DIAGNOSIS — Z12.5 SCREENING FOR MALIGNANT NEOPLASM OF PROSTATE: ICD-10-CM

## 2021-12-06 DIAGNOSIS — Z00.00 ADULT GENERAL MEDICAL EXAMINATION: ICD-10-CM

## 2021-12-06 PROCEDURE — 36415 COLL VENOUS BLD VENIPUNCTURE: CPT

## 2021-12-06 PROCEDURE — 84443 ASSAY THYROID STIM HORMONE: CPT

## 2021-12-06 PROCEDURE — 83036 HEMOGLOBIN GLYCOSYLATED A1C: CPT

## 2021-12-06 PROCEDURE — 80061 LIPID PANEL: CPT

## 2021-12-09 ENCOUNTER — OFFICE VISIT (OUTPATIENT)
Dept: FAMILY MEDICINE CLINIC | Facility: CLINIC | Age: 56
End: 2021-12-09
Payer: MEDICARE

## 2021-12-09 VITALS
WEIGHT: 196.19 LBS | RESPIRATION RATE: 18 BRPM | SYSTOLIC BLOOD PRESSURE: 110 MMHG | BODY MASS INDEX: 31.53 KG/M2 | HEART RATE: 76 BPM | DIASTOLIC BLOOD PRESSURE: 80 MMHG | HEIGHT: 66 IN | OXYGEN SATURATION: 98 %

## 2021-12-09 DIAGNOSIS — R97.20 ELEVATED PSA: ICD-10-CM

## 2021-12-09 DIAGNOSIS — I10 ESSENTIAL HYPERTENSION: ICD-10-CM

## 2021-12-09 DIAGNOSIS — R51.9 PRESSURE IN HEAD: ICD-10-CM

## 2021-12-09 DIAGNOSIS — Z53.20 COLON CANCER SCREENING DECLINED: ICD-10-CM

## 2021-12-09 DIAGNOSIS — E78.5 DYSLIPIDEMIA: ICD-10-CM

## 2021-12-09 DIAGNOSIS — Z91.81 RISK FOR FALLS: ICD-10-CM

## 2021-12-09 DIAGNOSIS — M54.2 CERVICALGIA: ICD-10-CM

## 2021-12-09 DIAGNOSIS — Z28.21 INFLUENZA VACCINATION DECLINED BY PATIENT: ICD-10-CM

## 2021-12-09 DIAGNOSIS — H40.9 GLAUCOMA OF BOTH EYES, UNSPECIFIED GLAUCOMA TYPE: ICD-10-CM

## 2021-12-09 DIAGNOSIS — Z00.00 ADULT GENERAL MEDICAL EXAMINATION: Primary | ICD-10-CM

## 2021-12-09 DIAGNOSIS — K59.01 SLOW TRANSIT CONSTIPATION: ICD-10-CM

## 2021-12-09 PROCEDURE — G0439 PPPS, SUBSEQ VISIT: HCPCS | Performed by: NURSE PRACTITIONER

## 2021-12-09 RX ORDER — AMLODIPINE BESYLATE 10 MG/1
10 TABLET ORAL DAILY
Qty: 90 TABLET | Refills: 1 | Status: SHIPPED | OUTPATIENT
Start: 2021-12-09

## 2021-12-09 NOTE — PROGRESS NOTES
HPI:   Denys Hernandez is a 60year old male who presents for a MA (Medicare Advantage) Supervisit (Once per calendar year). Feels well overall.  Seeing neurology regularly for pressure in head, CT showed disc disease in neck so he has been doing PT, which h Driving difficulties based on screening of functional status. Driving: Does not drive   He has difficulties Managing Money/Bills based on screening of functional status.    Managing money/bills: Need some help  He has difficulties Shopping for Groceries b 12/06/2021    CREATSERUM 1.18 06/15/2021     (H) 06/15/2021        CBC  (most recent labs)   Lab Results   Component Value Date    WBC 7.2 12/01/2021    HGB 16.1 12/01/2021    .0 12/01/2021        ALLERGIES:   He has No Known Allergies.     CU anxiety    EXAM:   /80 (BP Location: Left arm, Patient Position: Sitting, Cuff Size: adult)   Pulse 76   Resp 18   Ht 5' 6\" (1.676 m)   Wt 196 lb 3.2 oz (89 kg)   SpO2 98%   BMI 31.67 kg/m²   Estimated body mass index is 31.67 kg/m² as calculated fr Ears:  Normal TM's and external ear canals, both ears   Nose: Nares normal, septum midline, mucosa normal, no drainage or sinus tenderness   Throat: Lips, mucosa, and tongue normal; teeth and gums normal   Neck: Supple, symmetrical, trachea midline, no a use Miralax 1 capful daily x 1 week, if having daily BMs can decrease to 1/2 capful daily x 1 week then PRN. Discussed need to have good habits to maintain bowel regularity. Follow-up if no improvement.     Elevated PSA  -     UROLOGY - INTERNAL  -No urinar Results   Component Value Date     (H) 06/15/2021        Cardiovascular Disease Screening    Lipid Panel  Cholesterol  Lipoprotein (HDL)  Triglycerides Covered every 5 years for all Medicare beneficiaries without apparent signs or symptoms of cardio benefits -  No recommendations at this time

## 2021-12-09 NOTE — PATIENT INSTRUCTIONS
Constipation:  1. Drink plenty of water every day  2. Get regular exercise (walking counts!)  3. Eat fiber in your diet: Fruits, vegetables, whole grains  4. Start an over-the-counter fiber supplement twice daily.  You can take Benefiber or Metamucil mixe pressure  Your healthcare provider may recommend that you get more physical activity if you haven't been active. Your provider may recommend that you get moderate to vigorous physical activity for at least 40 minutes each day.  You should do this for at [place] [id number] cholesterol down. But you may need some extra help from medicine. If your doctor prescribes medicine, be sure to take it exactly as directed. Remember:   · Tell your healthcare provider about all other medicines you take. This includes vitamins and herbs. calcium (CAC) score. Depending on the results of this scan you and your provider may decide on whether starting a medicine for cholesterol is the best treatment option for you. Talk with your healthcare provider about your treatment goals.  Make sure you vegetables such as strawberries and peas. Soluble fiber can reduce cholesterol, which may help lower the risk for heart disease. It also helps control blood sugar levels.   Look for high-fiber foods  Try these foods to add fiber to your diet:  · Whole-grain content on 7/1/2019  © 9444-4106 [employer]. All rights reserved. This information is not intended as a substitute for professional medical care. Always follow your healthcare professional's instructions.         Treating Constipation  Farhad long-term constipation, to rule out other causes such as medicines or thyroid disease. Katie last reviewed this educational content on 6/1/2019  © 1124-2332 [employer]. All rights reserved.  This information is not intended as a substitu will need to follow-up with a colonoscopy. Screening recommendations advice vary varies among expert groups. Talk with your healthcare provider about which tests are best for you.   Some people should be screened using a different schedule because of their you decide to be tested.    Syphilis Men at increased risk for infection At routine exams; talk with your healthcare provider   Tuberculosis Men at increased risk for infection Talk with your healthcare provider   Vision All men in this age group Talk with Tetanus/diphtheria/pertussis (Td/Tdap) booster All men in this age group Td every 10 years, or a 1-time dose of Tdap instead of a Td booster after age 25, then Td every 10 years   Recombinant zoster vaccine (RZV0) All men in this age group 2 doses; the 2

## 2021-12-21 ENCOUNTER — OFFICE VISIT (OUTPATIENT)
Dept: PHYSICAL MEDICINE AND REHAB | Facility: CLINIC | Age: 56
End: 2021-12-21
Payer: MEDICARE

## 2021-12-21 VITALS
WEIGHT: 196 LBS | DIASTOLIC BLOOD PRESSURE: 78 MMHG | SYSTOLIC BLOOD PRESSURE: 124 MMHG | OXYGEN SATURATION: 99 % | HEART RATE: 64 BPM | BODY MASS INDEX: 32 KG/M2

## 2021-12-21 DIAGNOSIS — M79.18 MYOFASCIAL PAIN: ICD-10-CM

## 2021-12-21 DIAGNOSIS — R20.0 NUMBNESS AND TINGLING: Primary | ICD-10-CM

## 2021-12-21 DIAGNOSIS — R20.2 NUMBNESS AND TINGLING: Primary | ICD-10-CM

## 2021-12-21 PROCEDURE — 99204 OFFICE O/P NEW MOD 45 MIN: CPT | Performed by: PHYSICAL MEDICINE & REHABILITATION

## 2021-12-21 NOTE — PROGRESS NOTES
[address]  Progress Note    CHIEF COMPLAINT:  Patient presents with:  New Patient: Patient feels a pinchesd nerve that starts from the right shoulde and radaites to the right neck and head. Denies pain.  Glynn Aldana total) by mouth daily. 90 tablet 0   • Dorzolamide HCl-Timolol Mal 22.3-6.8 MG/ML Ophthalmic Solution Place 1 drop into both eyes 2 (two) times daily.        • latanoprost 0.005 % Ophthalmic Solution INSTILL 1 DROP BOTH EYES EVERY DAY AT BEDTIME AS DIRECTED impingement signs.   Neuro:   Cognition: alert & oriented x 3, attentive, able to follow 2 step commands, comprehention intact, spontaneous speech intact  Strength: Upper extremities have 5/5 strength  Sensation: Normal upper extremities  Reflexes: Normal u

## 2022-01-31 ENCOUNTER — OFFICE VISIT (OUTPATIENT)
Dept: FAMILY MEDICINE CLINIC | Facility: CLINIC | Age: 57
End: 2022-01-31
Payer: MEDICARE

## 2022-01-31 VITALS
HEIGHT: 66 IN | BODY MASS INDEX: 31.02 KG/M2 | DIASTOLIC BLOOD PRESSURE: 80 MMHG | SYSTOLIC BLOOD PRESSURE: 114 MMHG | OXYGEN SATURATION: 98 % | WEIGHT: 193 LBS | HEART RATE: 91 BPM

## 2022-01-31 DIAGNOSIS — H34.9 RETINAL ARTERY OCCLUSION: ICD-10-CM

## 2022-01-31 DIAGNOSIS — R20.2 NUMBNESS AND TINGLING: ICD-10-CM

## 2022-01-31 DIAGNOSIS — R20.0 NUMBNESS AND TINGLING: ICD-10-CM

## 2022-01-31 DIAGNOSIS — Z12.11 SCREENING FOR MALIGNANT NEOPLASM OF COLON: ICD-10-CM

## 2022-01-31 DIAGNOSIS — M62.838 MUSCLE SPASM: Primary | ICD-10-CM

## 2022-01-31 DIAGNOSIS — I10 ESSENTIAL HYPERTENSION: ICD-10-CM

## 2022-01-31 PROCEDURE — 99213 OFFICE O/P EST LOW 20 MIN: CPT | Performed by: NURSE PRACTITIONER

## 2022-01-31 RX ORDER — ASPIRIN 81 MG/1
81 TABLET ORAL DAILY
COMMUNITY
End: 2022-01-31

## 2022-01-31 RX ORDER — ASPIRIN 81 MG/1
81 TABLET ORAL DAILY
Qty: 90 TABLET | Refills: 1 | Status: SHIPPED | OUTPATIENT
Start: 2022-01-31

## 2022-01-31 NOTE — PROGRESS NOTES
Chief Complaint:   Patient presents with: Follow - Up: from Dr Ayla Prabhakar       HPI:   This is a 60year old male coming in for a strange feeling in his abdomen.  States he notices this when he is talking to his family at home, he will feel a \"pulling\" in upp Hypertension Sister    • Other (Other) Sister    • Other (Brain Aneurysm) Sister    • Other (Other) Brother    • Migraines Sister      Allergies:  No Known Allergies  Current Meds:  Current Outpatient Medications   Medication Sig Dispense Refill   • amLODI edema, no cyanosis, no clubbing, FROM, 2+ dorsalis pedis pulses bilaterally. NEURO:  No deficit, normal gait, strength and tone, sensory intact. ASSESSMENT AND PLAN:   1.  Muscle spasm  -Sensation likely a muscle spasm, recommended to watch for resoluti

## 2022-03-01 ENCOUNTER — APPOINTMENT (OUTPATIENT)
Dept: GENERAL RADIOLOGY | Facility: HOSPITAL | Age: 57
End: 2022-03-01
Attending: EMERGENCY MEDICINE
Payer: MEDICARE

## 2022-03-01 ENCOUNTER — HOSPITAL ENCOUNTER (EMERGENCY)
Facility: HOSPITAL | Age: 57
Discharge: HOME | End: 2022-03-01
Attending: EMERGENCY MEDICINE
Payer: MEDICARE

## 2022-03-01 VITALS
SYSTOLIC BLOOD PRESSURE: 142 MMHG | HEART RATE: 89 BPM | OXYGEN SATURATION: 97 % | WEIGHT: 190 LBS | HEIGHT: 66 IN | RESPIRATION RATE: 18 BRPM | TEMPERATURE: 99 F | BODY MASS INDEX: 30.53 KG/M2 | DIASTOLIC BLOOD PRESSURE: 91 MMHG

## 2022-03-01 DIAGNOSIS — R07.89 CHEST PAIN, ATYPICAL: Primary | ICD-10-CM

## 2022-03-01 LAB
ANION GAP SERPL CALC-SCNC: 5 MMOL/L (ref 0–18)
BASOPHILS # BLD AUTO: 0.04 X10(3) UL (ref 0–0.2)
BASOPHILS NFR BLD AUTO: 0.5 %
BUN BLD-MCNC: 10 MG/DL (ref 7–18)
BUN/CREAT SERPL: 8.4 (ref 10–20)
CALCIUM BLD-MCNC: 9.3 MG/DL (ref 8.5–10.1)
CHLORIDE SERPL-SCNC: 109 MMOL/L (ref 98–112)
CO2 SERPL-SCNC: 24 MMOL/L (ref 21–32)
CREAT BLD-MCNC: 1.19 MG/DL
D DIMER PPP FEU-MCNC: <0.27 UG/ML FEU (ref ?–0.58)
DEPRECATED RDW RBC AUTO: 43.1 FL (ref 35.1–46.3)
EOSINOPHIL # BLD AUTO: 0.03 X10(3) UL (ref 0–0.7)
EOSINOPHIL NFR BLD AUTO: 0.4 %
ERYTHROCYTE [DISTWIDTH] IN BLOOD BY AUTOMATED COUNT: 12.4 % (ref 11–15)
GLUCOSE BLD-MCNC: 101 MG/DL (ref 70–99)
HCT VFR BLD AUTO: 48.4 %
HGB BLD-MCNC: 15.9 G/DL
IMM GRANULOCYTES # BLD AUTO: 0.03 X10(3) UL (ref 0–1)
IMM GRANULOCYTES NFR BLD: 0.4 %
LYMPHOCYTES # BLD AUTO: 1.48 X10(3) UL (ref 1–4)
LYMPHOCYTES NFR BLD AUTO: 18.3 %
MCH RBC QN AUTO: 30.8 PG (ref 26–34)
MCHC RBC AUTO-ENTMCNC: 32.9 G/DL (ref 31–37)
MCV RBC AUTO: 93.6 FL
MONOCYTES # BLD AUTO: 0.67 X10(3) UL (ref 0.1–1)
MONOCYTES NFR BLD AUTO: 8.3 %
NEUTROPHILS # BLD AUTO: 5.84 X10 (3) UL (ref 1.5–7.7)
NEUTROPHILS # BLD AUTO: 5.84 X10(3) UL (ref 1.5–7.7)
NEUTROPHILS NFR BLD AUTO: 72.1 %
OSMOLALITY SERPL CALC.SUM OF ELEC: 285 MOSM/KG (ref 275–295)
PLATELET # BLD AUTO: 355 10(3)UL (ref 150–450)
POTASSIUM SERPL-SCNC: 3.8 MMOL/L (ref 3.5–5.1)
RBC # BLD AUTO: 5.17 X10(6)UL
SODIUM SERPL-SCNC: 138 MMOL/L (ref 136–145)
TROPONIN I HIGH SENSITIVITY: 24 NG/L
WBC # BLD AUTO: 8.1 X10(3) UL (ref 4–11)

## 2022-03-01 PROCEDURE — 85025 COMPLETE CBC W/AUTO DIFF WBC: CPT | Performed by: EMERGENCY MEDICINE

## 2022-03-01 PROCEDURE — 36415 COLL VENOUS BLD VENIPUNCTURE: CPT

## 2022-03-01 PROCEDURE — 93005 ELECTROCARDIOGRAM TRACING: CPT

## 2022-03-01 PROCEDURE — 71045 X-RAY EXAM CHEST 1 VIEW: CPT | Performed by: EMERGENCY MEDICINE

## 2022-03-01 PROCEDURE — 99284 EMERGENCY DEPT VISIT MOD MDM: CPT

## 2022-03-01 PROCEDURE — 93010 ELECTROCARDIOGRAM REPORT: CPT | Performed by: EMERGENCY MEDICINE

## 2022-03-01 PROCEDURE — 80048 BASIC METABOLIC PNL TOTAL CA: CPT | Performed by: EMERGENCY MEDICINE

## 2022-03-01 PROCEDURE — 84484 ASSAY OF TROPONIN QUANT: CPT | Performed by: EMERGENCY MEDICINE

## 2022-03-01 PROCEDURE — 85379 FIBRIN DEGRADATION QUANT: CPT | Performed by: EMERGENCY MEDICINE

## 2022-03-01 NOTE — ED INITIAL ASSESSMENT (HPI)
Pt arrived to ED from home c/o low back pain that radiates to upper back and chest discomfort starting before Christmas 2021. Pt reports pain has been more constant recently so he came in to be seen. Pt speaking in full sentences. Pt also c/o \"sinking feeling\" in stomach, denies pain, N/D/V.

## 2022-03-16 ENCOUNTER — OFFICE VISIT (OUTPATIENT)
Dept: NEUROLOGY | Facility: CLINIC | Age: 57
End: 2022-03-16
Payer: MEDICARE

## 2022-03-16 VITALS — DIASTOLIC BLOOD PRESSURE: 74 MMHG | HEART RATE: 88 BPM | SYSTOLIC BLOOD PRESSURE: 122 MMHG

## 2022-03-16 DIAGNOSIS — R20.2 PARESTHESIA: ICD-10-CM

## 2022-03-16 DIAGNOSIS — R26.81 GAIT INSTABILITY: ICD-10-CM

## 2022-03-16 DIAGNOSIS — G44.86 CERVICOGENIC HEADACHE: Primary | ICD-10-CM

## 2022-03-16 DIAGNOSIS — H34.12 CENTRAL RETINAL ARTERY OCCLUSION OF LEFT EYE: ICD-10-CM

## 2022-03-16 DIAGNOSIS — R10.9 STOMACH PAIN: ICD-10-CM

## 2022-03-16 PROCEDURE — 99214 OFFICE O/P EST MOD 30 MIN: CPT | Performed by: OTHER

## 2022-03-16 RX ORDER — TIZANIDINE 2 MG/1
1 TABLET ORAL EVERY 8 HOURS PRN
Qty: 45 TABLET | Refills: 0 | Status: SHIPPED | OUTPATIENT
Start: 2022-03-16 | End: 2022-04-15

## 2022-03-16 NOTE — PATIENT INSTRUCTIONS
-Tizandine 1 mg as needed (1/2 tablet) - please use at night, be careful and monitor for lightheadedness, drowsiness, confusion   -Call my office in 2-4 weeks and give us an update   -Please see gastroenterology   -Follow up in 2 months or sooner if needed. -If you develop sudden onset loss of vision, double vision, room spinning/world spinning sensation, inability to speak or understand others who are speaking to you, slurred speech, balance problems, weakness on one side of your body, numbness on one side of your body or worst headache you ever had, please seek out emergency medical attention via 911 for the nearest emergency room to be evaluated for possible stroke. -MyChart or call office with any questions or concerns. Thank you for coming to see me today. The easiest way to communicate with me is via [employer] is meant for simple questions regarding medications, possible side effects, or other simple straight forward questions in limited sentences, rather than multiple paragraphs of discussion. [employer] is not meant for, or efficient for these complex questions, extensive questions, extensive medication adjustments, complex new symptoms or concerns. These issues beyond simple questions require a follow up visit with myself. Refills:  Please pay attention to when your refills will need to be renewed. Due to the volume of phone calls daily, this could potentially take a few days, although we certainly try to honor your refill requests as soon as we can. You should call at least 1 week in advance of needing a refill to ensure you do not run out of medication. Keep in mind that refill requests on Fridays may not be filled until the following week.

## 2022-03-23 ENCOUNTER — TELEPHONE (OUTPATIENT)
Dept: NEUROLOGY | Facility: CLINIC | Age: 57
End: 2022-03-23

## 2022-03-23 NOTE — TELEPHONE ENCOUNTER
Spoke to pt sister Tyrel Mikey, she state Dr. Astrid Tristan gave instruction to take half of Tizanidine 2 mg  every night. She notice of the prescription bottle it says to take every 8 hrs as needed. Pt is still having some discomfort and pressure in head. Tyrel Jensen would like clarity of dosage and use of the Tizanidine. As I confirmed medication dosage and instruction from chart.

## 2022-03-23 NOTE — TELEPHONE ENCOUNTER
Spoke with sister Emily Desai about Dr. Mainor Weems message. Emily Desai understood dosage and instruction per Dr. Mainor Weems. She state she will try this and if she does not see any improvement with 1-2 week she will call office back.

## 2022-04-05 NOTE — TELEPHONE ENCOUNTER
Called & spoke to patient sister Sherry Hargrove (HIPPA verified). Sherry Bhatsoni states the patient is currently taking Tizanidine 2mg oral tab, 1/2 tablet in the morning & 1/2 tablet in the evening. Sherry Bhatsoni states the patient hasn't seen any improvement & would like advisement from Dr Yojana Billy on what to do next or if there are any other options.

## 2022-04-05 NOTE — TELEPHONE ENCOUNTER
Aide Smith called to say there is still no improvement for the Patient in regards to this medication. She is interested in other options.

## 2022-04-05 NOTE — TELEPHONE ENCOUNTER
Called sister Meghana Lemus to notify of dosage increase as noted below by Dr Jessica Lainez. Meghana Lemus repeated instructions back. Pt to start taking a full tablet twice daily & will call office with update.

## 2022-05-05 RX ORDER — POLYETHYLENE GLYCOL 3350, SODIUM CHLORIDE, SODIUM BICARBONATE, POTASSIUM CHLORIDE 420; 11.2; 5.72; 1.48 G/4L; G/4L; G/4L; G/4L
POWDER, FOR SOLUTION ORAL
Qty: 4000 ML | Refills: 0 | Status: CANCELLED | OUTPATIENT
Start: 2022-05-05

## 2022-05-19 ENCOUNTER — OFFICE VISIT (OUTPATIENT)
Dept: GASTROENTEROLOGY | Facility: CLINIC | Age: 57
End: 2022-05-19
Payer: MEDICARE

## 2022-05-19 VITALS
BODY MASS INDEX: 31.18 KG/M2 | HEART RATE: 88 BPM | HEIGHT: 66 IN | SYSTOLIC BLOOD PRESSURE: 111 MMHG | WEIGHT: 194 LBS | TEMPERATURE: 99 F | DIASTOLIC BLOOD PRESSURE: 73 MMHG

## 2022-05-19 DIAGNOSIS — M62.838 MUSCLE SPASM: ICD-10-CM

## 2022-05-19 DIAGNOSIS — Z12.11 SCREENING FOR COLON CANCER: Primary | ICD-10-CM

## 2022-05-19 PROCEDURE — 99204 OFFICE O/P NEW MOD 45 MIN: CPT | Performed by: NURSE PRACTITIONER

## 2022-05-19 RX ORDER — DORZOLAMIDE HCL 20 MG/ML
SOLUTION/ DROPS OPHTHALMIC
COMMUNITY
Start: 2022-04-29

## 2022-05-19 NOTE — PATIENT INSTRUCTIONS
-Schedule colonoscopy w/ first available MD w/ IV Twilight or MAC  Dx:   -Eligible for NE: Yes r/t BMI < 40  -Prep: Split dose Colyte/TriLyte or equivalent  -Anti-platelets and anti-coagulants: ASA - continue as prescribed  -Diabetes meds: none     ** If MAC:    - HOLD ACE/ARBs the night before and/or the day of the procedure(s) - N/A   - NO alcohol, recreational drugs nor erectile dysfunction medications 24 hours before procedure(s)   - NO herbal supplements or weight loss medications (phentermine/Vyvanse/Adderall)  x 7 days prior to the procedure(s)    ** If MAC @ [facility] or IV twilight - continue all medications as prescribed    ** COVID-19 testing required 72 hours prior to procedure    **Patient to follow-up with any new medical history/medications prior to procedure**

## 2022-06-01 ENCOUNTER — OFFICE VISIT (OUTPATIENT)
Dept: NEUROLOGY | Facility: CLINIC | Age: 57
End: 2022-06-01
Payer: MEDICARE

## 2022-06-01 VITALS
SYSTOLIC BLOOD PRESSURE: 126 MMHG | HEART RATE: 72 BPM | HEIGHT: 66 IN | BODY MASS INDEX: 31.18 KG/M2 | DIASTOLIC BLOOD PRESSURE: 74 MMHG | WEIGHT: 194 LBS

## 2022-06-01 DIAGNOSIS — H34.12 CENTRAL RETINAL ARTERY OCCLUSION OF LEFT EYE: ICD-10-CM

## 2022-06-01 DIAGNOSIS — M47.812 CERVICAL SPONDYLOSIS: ICD-10-CM

## 2022-06-01 DIAGNOSIS — R26.81 GAIT INSTABILITY: ICD-10-CM

## 2022-06-01 DIAGNOSIS — G44.86 CERVICOGENIC HEADACHE: Primary | ICD-10-CM

## 2022-06-01 PROCEDURE — 99214 OFFICE O/P EST MOD 30 MIN: CPT | Performed by: OTHER

## 2022-06-01 RX ORDER — GABAPENTIN 100 MG/1
100 CAPSULE ORAL 3 TIMES DAILY
COMMUNITY
End: 2022-06-01

## 2022-06-01 RX ORDER — GABAPENTIN 100 MG/1
CAPSULE ORAL
Qty: 12 CAPSULE | Refills: 0 | Status: SHIPPED | OUTPATIENT
Start: 2022-06-01 | End: 2022-06-09

## 2022-06-01 RX ORDER — DULOXETIN HYDROCHLORIDE 20 MG/1
20 CAPSULE, DELAYED RELEASE ORAL DAILY
Qty: 30 CAPSULE | Refills: 0 | Status: SHIPPED | OUTPATIENT
Start: 2022-06-01 | End: 2022-07-01

## 2022-06-01 NOTE — PATIENT INSTRUCTIONS
-Taper off Gabapentin    100 mg twice daily for 4 days, then 100 mg daily for 4 days, then stop   -Start Cymbalta 20 mg daily - monitor for nausea, stomach upset  -Follow up in 3 months or sooner if needed. -If you develop sudden onset loss of vision, double vision, room spinning/world spinning sensation, inability to speak or understand others who are speaking to you, slurred speech, balance problems, weakness on one side of your body, numbness on one side of your body or worst headache you ever had, please seek out emergency medical attention via 911 for the nearest emergency room to be evaluated for possible stroke. -MyChart or call office with any questions or concerns. Thank you for coming to see me today. The easiest way to communicate with me is via [employer]. Spoon[employer] is meant for simple questions regarding medications, possible side effects, or other simple straight forward questions in limited sentences, rather than multiple paragraphs of discussion. [employer] is not meant for, or efficient for these complex questions, extensive questions, extensive medication adjustments, complex new symptoms or concerns. These issues beyond simple questions require a follow up visit with myself. Refills:  Please pay attention to when your refills will need to be renewed. Due to the volume of phone calls daily, this could potentially take a few days, although we certainly try to honor your refill requests as soon as we can. You should call at least 1 week in advance of needing a refill to ensure you do not run out of medication. Keep in mind that refill requests on Fridays may not be filled until the following week.

## 2022-06-13 ENCOUNTER — OFFICE VISIT (OUTPATIENT)
Dept: FAMILY MEDICINE CLINIC | Facility: CLINIC | Age: 57
End: 2022-06-13
Payer: MEDICARE

## 2022-06-13 VITALS
OXYGEN SATURATION: 95 % | WEIGHT: 197 LBS | SYSTOLIC BLOOD PRESSURE: 120 MMHG | RESPIRATION RATE: 16 BRPM | BODY MASS INDEX: 31.66 KG/M2 | DIASTOLIC BLOOD PRESSURE: 70 MMHG | HEIGHT: 66 IN | HEART RATE: 84 BPM

## 2022-06-13 DIAGNOSIS — H34.9 RETINAL ARTERY OCCLUSION: ICD-10-CM

## 2022-06-13 DIAGNOSIS — I10 ESSENTIAL HYPERTENSION: ICD-10-CM

## 2022-06-13 DIAGNOSIS — R20.0 NUMBNESS AND TINGLING: ICD-10-CM

## 2022-06-13 DIAGNOSIS — R20.2 NUMBNESS AND TINGLING: ICD-10-CM

## 2022-06-13 DIAGNOSIS — M62.838 MUSCLE SPASM: Primary | ICD-10-CM

## 2022-06-13 DIAGNOSIS — R36.9 PENILE DISCHARGE: ICD-10-CM

## 2022-06-13 DIAGNOSIS — R97.20 ELEVATED PSA: ICD-10-CM

## 2022-06-13 LAB
BILIRUBIN: NEGATIVE
GLUCOSE (URINE DIPSTICK): NEGATIVE MG/DL
KETONES (URINE DIPSTICK): NEGATIVE MG/DL
LEUKOCYTES: NEGATIVE
MULTISTIX LOT#: NORMAL NUMERIC
NITRITE, URINE: NEGATIVE
OCCULT BLOOD: NEGATIVE
PH, URINE: 7.5 (ref 4.5–8)
PROTEIN (URINE DIPSTICK): NEGATIVE MG/DL
SPECIFIC GRAVITY: 1.02 (ref 1–1.03)
URINE-COLOR: YELLOW
UROBILINOGEN,SEMI-QN: 1 MG/DL (ref 0–1.9)

## 2022-06-13 PROCEDURE — 81003 URINALYSIS AUTO W/O SCOPE: CPT | Performed by: NURSE PRACTITIONER

## 2022-06-13 PROCEDURE — 87591 N.GONORRHOEAE DNA AMP PROB: CPT | Performed by: NURSE PRACTITIONER

## 2022-06-13 PROCEDURE — 87491 CHLMYD TRACH DNA AMP PROBE: CPT | Performed by: NURSE PRACTITIONER

## 2022-06-13 PROCEDURE — 87086 URINE CULTURE/COLONY COUNT: CPT | Performed by: NURSE PRACTITIONER

## 2022-06-13 PROCEDURE — 99214 OFFICE O/P EST MOD 30 MIN: CPT | Performed by: NURSE PRACTITIONER

## 2022-06-13 RX ORDER — AMLODIPINE BESYLATE 10 MG/1
10 TABLET ORAL DAILY
Qty: 90 TABLET | Refills: 1 | Status: SHIPPED | OUTPATIENT
Start: 2022-06-13

## 2022-06-13 RX ORDER — ASPIRIN 81 MG/1
81 TABLET ORAL DAILY
Qty: 90 TABLET | Refills: 1 | Status: SHIPPED | OUTPATIENT
Start: 2022-06-13

## 2022-06-14 LAB
C TRACH DNA SPEC QL NAA+PROBE: NEGATIVE
N GONORRHOEA DNA SPEC QL NAA+PROBE: NEGATIVE

## 2022-06-27 DIAGNOSIS — G44.86 CERVICOGENIC HEADACHE: Primary | ICD-10-CM

## 2022-06-27 RX ORDER — DULOXETIN HYDROCHLORIDE 20 MG/1
20 CAPSULE, DELAYED RELEASE ORAL DAILY
Qty: 30 CAPSULE | Refills: 2 | Status: SHIPPED | OUTPATIENT
Start: 2022-06-27 | End: 2022-09-25

## 2022-06-27 NOTE — TELEPHONE ENCOUNTER
Called to discuss with patient. Spoke with pt sister, KIRSTY Verified & pt present with sister at time of call. Per Epic review, LOV 6/1/22 for cervicogenic headache    Patient continues to experience same amount of right sided forehead pain as previous office visit. Stopped gabapentin because it provided no relief & requesting refill of duloxetine. Patient would like to know what to do for pain.

## 2022-06-28 ENCOUNTER — TELEPHONE (OUTPATIENT)
Dept: NEUROLOGY | Facility: CLINIC | Age: 57
End: 2022-06-28

## 2022-06-28 NOTE — TELEPHONE ENCOUNTER
PA for duloxetine 30 mg completed today through "[employer]. Will await determination to be faxed to office.

## 2022-06-29 NOTE — TELEPHONE ENCOUNTER
Received denial for duloxetine sprinkle 30 mg. They will cover 30 mg caps. Pended duloxetine 30 mg caps.

## 2022-06-30 RX ORDER — DULOXETIN HYDROCHLORIDE 30 MG/1
CAPSULE, DELAYED RELEASE ORAL
Qty: 54 CAPSULE | Refills: 0 | Status: SHIPPED | OUTPATIENT
Start: 2022-06-30

## 2022-08-10 ENCOUNTER — TELEPHONE (OUTPATIENT)
Dept: NEUROLOGY | Facility: CLINIC | Age: 57
End: 2022-08-10

## 2022-08-10 DIAGNOSIS — G44.86 CERVICOGENIC HEADACHE: Primary | ICD-10-CM

## 2022-08-10 RX ORDER — VENLAFAXINE HYDROCHLORIDE 37.5 MG/1
37.5 CAPSULE, EXTENDED RELEASE ORAL DAILY
Qty: 30 CAPSULE | Refills: 0 | Status: SHIPPED | OUTPATIENT
Start: 2022-08-10 | End: 2022-09-09

## 2022-08-10 RX ORDER — DULOXETIN HYDROCHLORIDE 30 MG/1
30 CAPSULE, DELAYED RELEASE ORAL DAILY
Qty: 4 CAPSULE | Refills: 0 | Status: SHIPPED | OUTPATIENT
Start: 2022-08-10 | End: 2022-08-14

## 2022-08-10 NOTE — TELEPHONE ENCOUNTER
Sherry Hargrove called regarding this prescription:  DULoxetine 30 MG Oral Cap DR Particles    They are due for a refill but said it is not helping the Patient at all so she is wondering if perhaps Dr. Diana Page want to try something else.

## 2022-08-10 NOTE — TELEPHONE ENCOUNTER
Spoke to Minnie celis who states they would opt to try Venlafaxine. They would like to avoid Amitriptyline since patient already has some special needs- they do not want to cause him more confusion per Minnie celis.

## 2022-08-14 DIAGNOSIS — H34.9 RETINAL ARTERY OCCLUSION: ICD-10-CM

## 2022-08-15 ENCOUNTER — OFFICE VISIT (OUTPATIENT)
Dept: SURGERY | Facility: CLINIC | Age: 57
End: 2022-08-15
Payer: MEDICARE

## 2022-08-15 ENCOUNTER — LAB ENCOUNTER (OUTPATIENT)
Dept: LAB | Facility: HOSPITAL | Age: 57
End: 2022-08-15
Attending: UROLOGY
Payer: MEDICARE

## 2022-08-15 VITALS
BODY MASS INDEX: 31.66 KG/M2 | SYSTOLIC BLOOD PRESSURE: 112 MMHG | DIASTOLIC BLOOD PRESSURE: 70 MMHG | WEIGHT: 197 LBS | RESPIRATION RATE: 16 BRPM | HEIGHT: 66 IN

## 2022-08-15 DIAGNOSIS — R36.9 URETHRAL DISCHARGE: Primary | ICD-10-CM

## 2022-08-15 DIAGNOSIS — R97.20 ELEVATED PSA: ICD-10-CM

## 2022-08-15 DIAGNOSIS — R36.9 URETHRAL DISCHARGE: ICD-10-CM

## 2022-08-15 LAB
BILIRUB UR QL: NEGATIVE
CLARITY UR: CLEAR
COLOR UR: YELLOW
GLUCOSE UR-MCNC: NEGATIVE MG/DL
HGB UR QL STRIP.AUTO: NEGATIVE
KETONES UR-MCNC: NEGATIVE MG/DL
LEUKOCYTE ESTERASE UR QL STRIP.AUTO: NEGATIVE
NITRITE UR QL STRIP.AUTO: NEGATIVE
PH UR: 7 [PH] (ref 5–8)
PSA SERPL-MCNC: 8.6 NG/ML (ref ?–4)
SP GR UR STRIP: 1.02 (ref 1–1.03)
UROBILINOGEN UR STRIP-ACNC: 0.2

## 2022-08-15 PROCEDURE — 81003 URINALYSIS AUTO W/O SCOPE: CPT

## 2022-08-15 PROCEDURE — 84153 ASSAY OF PSA TOTAL: CPT

## 2022-08-15 PROCEDURE — 36415 COLL VENOUS BLD VENIPUNCTURE: CPT

## 2022-08-15 PROCEDURE — 99204 OFFICE O/P NEW MOD 45 MIN: CPT | Performed by: UROLOGY

## 2022-08-15 RX ORDER — CIPROFLOXACIN 500 MG/1
500 TABLET, FILM COATED ORAL 2 TIMES DAILY
Qty: 20 TABLET | Refills: 0 | Status: SHIPPED | OUTPATIENT
Start: 2022-08-15

## 2022-08-15 RX ORDER — ASPIRIN 81 MG/1
TABLET, COATED ORAL
Qty: 90 TABLET | Refills: 1 | Status: SHIPPED | OUTPATIENT
Start: 2022-08-15

## 2022-09-12 RX ORDER — VENLAFAXINE HYDROCHLORIDE 37.5 MG/1
CAPSULE, EXTENDED RELEASE ORAL
Qty: 30 CAPSULE | Refills: 0 | Status: SHIPPED | OUTPATIENT
Start: 2022-09-12

## 2022-09-12 NOTE — TELEPHONE ENCOUNTER
Refill request for venlafaxine ER 37.5 mg, take 1 cap daily, #30, no refills    LOV: 3/16/22  NOV: 9/13/22  Last refilled on 8/10/22

## 2022-09-13 ENCOUNTER — OFFICE VISIT (OUTPATIENT)
Dept: NEUROLOGY | Facility: CLINIC | Age: 57
End: 2022-09-13
Payer: MEDICARE

## 2022-09-13 VITALS — DIASTOLIC BLOOD PRESSURE: 80 MMHG | HEART RATE: 92 BPM | SYSTOLIC BLOOD PRESSURE: 122 MMHG

## 2022-09-13 DIAGNOSIS — H34.12 CENTRAL RETINAL ARTERY OCCLUSION OF LEFT EYE: ICD-10-CM

## 2022-09-13 DIAGNOSIS — M47.812 CERVICAL SPONDYLOSIS: Primary | ICD-10-CM

## 2022-09-13 DIAGNOSIS — G44.86 CERVICOGENIC HEADACHE: ICD-10-CM

## 2022-09-13 PROCEDURE — 99214 OFFICE O/P EST MOD 30 MIN: CPT | Performed by: OTHER

## 2022-09-13 RX ORDER — VENLAFAXINE 75 MG/1
75 TABLET ORAL DAILY
Qty: 30 TABLET | Refills: 0 | Status: SHIPPED | OUTPATIENT
Start: 2022-09-13 | End: 2022-10-13

## 2022-09-28 ENCOUNTER — HOSPITAL ENCOUNTER (OUTPATIENT)
Dept: MRI IMAGING | Age: 57
Discharge: HOME | End: 2022-09-28
Attending: Other
Payer: MEDICARE

## 2022-09-28 DIAGNOSIS — M47.812 CERVICAL SPONDYLOSIS: ICD-10-CM

## 2022-09-28 PROCEDURE — 72141 MRI NECK SPINE W/O DYE: CPT | Performed by: OTHER

## 2022-09-29 ENCOUNTER — TELEPHONE (OUTPATIENT)
Dept: NEUROLOGY | Facility: CLINIC | Age: 57
End: 2022-09-29

## 2022-09-29 NOTE — TELEPHONE ENCOUNTER
Called patient. Cell # listed is sister's #. Spoke to sister (hippa verified) who states helps brother with appointments & medical issues. Notified of message from Dr Suki Stone as noted below. Pt to see Dr Felix Barton on 10/5/22.  They want to get his opinion & then they will call & schedule with neurosurgeon

## 2022-09-29 NOTE — TELEPHONE ENCOUNTER
Hello, can you please call patient and let him know that his MRI cervical spine resulted? 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 His spinal cord is not showing any damage itself but this arthritis can lead to pain and headaches. As we discussed during our office visit, he would benefit from seeing neurosurgery to discuss options to help treat this. Please let me know if he has had any difficulty with scheduling this. Thanks!

## 2022-10-05 ENCOUNTER — OFFICE VISIT (OUTPATIENT)
Dept: PHYSICAL MEDICINE AND REHAB | Facility: CLINIC | Age: 57
End: 2022-10-05
Payer: MEDICARE

## 2022-10-05 VITALS — WEIGHT: 198 LBS | HEIGHT: 66 IN | OXYGEN SATURATION: 98 % | HEART RATE: 99 BPM | BODY MASS INDEX: 31.82 KG/M2

## 2022-10-05 DIAGNOSIS — M54.2 CERVICALGIA: ICD-10-CM

## 2022-10-05 DIAGNOSIS — M79.18 MYOFASCIAL PAIN: Primary | ICD-10-CM

## 2022-10-05 PROCEDURE — 99213 OFFICE O/P EST LOW 20 MIN: CPT | Performed by: PHYSICAL MEDICINE & REHABILITATION

## 2022-10-10 ENCOUNTER — TELEPHONE (OUTPATIENT)
Dept: PHYSICAL THERAPY | Facility: HOSPITAL | Age: 57
End: 2022-10-10

## 2022-10-11 ENCOUNTER — OFFICE VISIT (OUTPATIENT)
Dept: PHYSICAL THERAPY | Facility: HOSPITAL | Age: 57
End: 2022-10-11
Attending: PHYSICAL MEDICINE & REHABILITATION
Payer: MEDICARE

## 2022-10-11 DIAGNOSIS — M54.2 CERVICALGIA: ICD-10-CM

## 2022-10-11 DIAGNOSIS — M79.18 MYOFASCIAL PAIN: ICD-10-CM

## 2022-10-11 PROCEDURE — 97110 THERAPEUTIC EXERCISES: CPT

## 2022-10-11 PROCEDURE — 97161 PT EVAL LOW COMPLEX 20 MIN: CPT

## 2022-10-12 ENCOUNTER — TELEPHONE (OUTPATIENT)
Dept: NEUROLOGY | Facility: CLINIC | Age: 57
End: 2022-10-12

## 2022-10-12 RX ORDER — VENLAFAXINE HYDROCHLORIDE 75 MG/1
75 CAPSULE, EXTENDED RELEASE ORAL DAILY
Qty: 30 CAPSULE | Refills: 0 | Status: SHIPPED | OUTPATIENT
Start: 2022-10-12 | End: 2022-11-11

## 2022-10-12 RX ORDER — VENLAFAXINE HYDROCHLORIDE 37.5 MG/1
37.5 CAPSULE, EXTENDED RELEASE ORAL DAILY
Qty: 30 CAPSULE | Refills: 0 | Status: SHIPPED | OUTPATIENT
Start: 2022-10-12 | End: 2022-11-11

## 2022-10-12 NOTE — TELEPHONE ENCOUNTER
Patient saw Dr. Zeus Isaacs who started him in PT. Patient is out of this medication   Venlafaxine HCl 75 MG Oral Tab  BUT his wife said it isn't working at all so she isn't sure if Doctor even wants to renew it or perhaps start him on something else. Please just let her know.

## 2022-10-12 NOTE — TELEPHONE ENCOUNTER
Spoke to patient and let her know Dr. Brynn Betancourt is recommending increasing dosage. She will be giving us a call in two weeks to let us know how patient is doing.

## 2022-10-12 NOTE — TELEPHONE ENCOUNTER
Pt has started PT yesterday and saw Dr Apolinar Okeefe about a week ago. She states since starting the medication in August there has been no change to his head issues. She would like to know if we should change the meds or what the next steps would be.      Please advise

## 2022-10-13 ENCOUNTER — TELEPHONE (OUTPATIENT)
Dept: SURGERY | Facility: CLINIC | Age: 57
End: 2022-10-13

## 2022-10-13 DIAGNOSIS — R97.20 ELEVATED PSA: Primary | ICD-10-CM

## 2022-10-13 NOTE — TELEPHONE ENCOUNTER
I called the HM # listed and s/w pt and pt stated his brother, Prema Estrada was not at home and neither was his sister, Bill Hills. He did not have his brother's cell # and I told him that I would try to reach [facility]. I called [facility] and had to [place].

## 2022-10-17 NOTE — TELEPHONE ENCOUNTER
I s/w pt's sister Tyrel Jensen and I informed her of [facility] as stated below and she accepted an appt for pt for Tues. 11/15 at 1:50 pm and I told her that I will place that PSA order now and pt should complete it 1-2 days prior to this appt.

## 2022-10-18 ENCOUNTER — OFFICE VISIT (OUTPATIENT)
Dept: PHYSICAL THERAPY | Facility: HOSPITAL | Age: 57
End: 2022-10-18
Attending: PHYSICAL MEDICINE & REHABILITATION
Payer: MEDICARE

## 2022-10-18 DIAGNOSIS — M79.18 MYOFASCIAL PAIN: ICD-10-CM

## 2022-10-18 DIAGNOSIS — M54.2 CERVICALGIA: ICD-10-CM

## 2022-10-18 PROCEDURE — 97112 NEUROMUSCULAR REEDUCATION: CPT

## 2022-10-18 PROCEDURE — 97110 THERAPEUTIC EXERCISES: CPT

## 2022-10-18 NOTE — PROGRESS NOTES
Jacki Neola    12/12/1965  Referring Physician:  Jose Mortensen  Diagnosis: Myofascial pain (M79.18)  Cervicalgia (M54.2)    Initial Evaluation Date: 10/11/2022  Date of Surgery: None for this diagnosis    Insurance type and authorized visits: [employer] Medicare, NA  Plan of care expires: For orders: 1/9/2023; For insurance: Oct 05, 2023    Precautions/Hx: HTN, glaucoma, cognitive limitations. SUBJECTIVE:     Pt did not have any adverse reaction to the initial evaluation or treatment. Pt notes that he had a little improvement with lifting his head from looking down. Visit count 1/9/2023 1/8 2/8    Date 10/11/2022 10/18/2022     Neck pain R>L      Pain Range 2 to 7/10 2-7/10    Pain Ave 3 to 4/10 3-4/10       OBJECTIVE:     Treatment performed this date:    Therapeutic Exercise:  Visit #   1/8 2/8   Position Exercise HEP 10/11/2022 10/18/2022    NuStep Seat 9, Arms 10, Resist 5   X 6 min, 2 intervals   Supine Median nerve glides H X x    Diastasis hand hold head lift H X x   Sitting Median nerve glide H X x    Trunk rotation chair H  X   Standing Median nerve glide H  X x    Cat cow H  X   4 point Cat cow H  X    Neuro Posture  X     Re-ed   X see assessment    H = HEP. Pt given copies of this exercise for home program.  X = Exercises done this date - pt verbalized understanding and demonstrated competence. All exercises done B unless otherwise indicated. Pt advised to discontinue exercises that increase pain and to call or return to therapist to discuss. Each intervention above is specifically prescribed to address the patients identified impairments, activity limitations, and participation restrictions. ASSESSMENT     Pt educated on the importance of generalized exercise and avoidance of prolonged sitting. Pt advised to move every 30 min. Pt instructed in use of aerobic activity with interval work to be performed as part of HEP. Pt advised to progress very slowly and remain consistent.   Discussed importance of thoracic mobility to cervical function. Pt did well with full spinal mobility in 4 point. Pt educated on the importance of thoracic mobility especially in rotation to protect lumbar and cervical spine. Spine model used to demonstrate orientation of facet joints. Pt and sister verbalized understanding. Physical Therapy Goals:  From 10/11/2022 to 1/9/2023  - Created w patient input during initial assessment  1. Pt will be independent in beginning level of HEP for stretching, posture and strengthening. 2. Pt will be able to reach behind him without increased symptoms. 3. Pt will be able to raise his head from flex to neutral without increased symptoms. 4. Pt will be able to look into upper cabinet without increased symptoms. PLAN OF CARE:      Continue PT per original plan for therapeutic exercises, posture retraining, therapeutic activities, manual treatment, neuromuscular reeducation, therapeutic pain neuroscience education, patient education, self care home management, home exercise program, and modalities as needed. Charges: TE2, Neuro    Total Timed treatment: 40 min      Total Treatment Time: 40 min   _____________________________________________________________________________________________    21st Century Cures Act Notice to Patient: Medical documents like this are made available to patients in the interest of transparency. However, be advised this is a medical document and it is intended as [id number] communication between your medical providers. This medical document may contain abbreviations, assessments, medical data, and results or other terms that are unfamiliar. Medical documents are intended to carry relevant information, facts as evident, and the clinical opinion of the practitioner. As such, this medical document may be written in language that appears blunt or direct.  You are encouraged to contact your medical provider and/or [facility] Experience if you have any questions about this medical document. Objective Initial Evaluation Data 10/11/2022:    Functional Scores 10/11/2022   FOTO primary measure 67     Posture:  R shoulder elevated, increased R LE wt bearing, flattened thoracic kyphosis, all kyphosis at C-T junction.      Dermatomes 10/11/2022       Lat neck (C4) R wnl   Lat neck (C4) L wnl   Ant deltoid (C5) R wnl   Ant deltoid (C5) L wnl   Dorsal prox thumb (C6) R wnl   Dorsal prox thumb (C6) L wnl   Dorsal prox 3 digit (C7) R wnl   Dorsal prox 3 digit (C7) L wnl   Dorsal 4-5 digits (C8) R wnl   Dorsal 4-5 digits (C8) L wnl   Med prox elbow (T1) R wnl   Med prox elbow (T1) L wnl       Abdominals 10/11/2022   Tone fair     Diastasis in fingers 10/11/2022   12 cm above umbilicus 3   6 cm above umbilicus 3-4   At umbilicus 3-4   6 cm below umbilicus 3   12 cm below umbilicus 2-3      UE Neural Glides 10/11/2022   ULTT 1 R min   ULTT 1 L min   ULTT 2 R wnl   ULTT 2 L  wnl   ULTT 3 R wnl   ULTT 3 L wnl       Cervical ROM 10/11/2022   Fwd head 29   Flexion 60 nl 47   Extension 70 nl 52   R SB 45 nl 24   L SB 45 nl 31   R Rotation 80 nl 50   L Rotation 80 nl 43   *pain limiting     Shoulder ROM 10/11/2022   Flex R  180 nl 150   Flex L  180 nl 145   Extn R  50 nl 49   Extn L  50 nl 46   Abd R  180 nl 165   Abd L  180 nl 161   ER R  90 nl 105   ER L  90 nl 90   IR R  80 nl 37   IR L  80 nl 55   *pain limiting    MMT 5/5 10/11/2022   C5 shldr abd R 5   Shldr abd L 5   C6 biceps R 5   Biceps L 5   C7 Triceps R 5   Triceps L 5   C8 EPL R 5   EPL L 5   T1 Interossei R 5   Interossei L 5   ER R 5   ER L 5   IR R 5   IR L 5   *pain limiting      UE flexibility 10/11/2022   UT R mod   UT L Mod-max   Scalenes R minmod   Scalenes L mod   Lats R Min-mod   Lats L mod

## 2022-10-21 ENCOUNTER — OFFICE VISIT (OUTPATIENT)
Dept: PHYSICAL THERAPY | Facility: HOSPITAL | Age: 57
End: 2022-10-21
Attending: PHYSICAL MEDICINE & REHABILITATION
Payer: MEDICARE

## 2022-10-21 DIAGNOSIS — M79.18 MYOFASCIAL PAIN: ICD-10-CM

## 2022-10-21 DIAGNOSIS — M54.2 CERVICALGIA: ICD-10-CM

## 2022-10-21 PROCEDURE — 97140 MANUAL THERAPY 1/> REGIONS: CPT

## 2022-10-21 PROCEDURE — 97110 THERAPEUTIC EXERCISES: CPT

## 2022-10-21 NOTE — PROGRESS NOTES
Aldo Litter    12/12/1965  Referring Physician:  Keith Cruz  Diagnosis: Myofascial pain (M79.18)  Cervicalgia (M54.2)    Initial Evaluation Date: 10/11/2022  Date of Surgery: None for this diagnosis    Insurance type and authorized visits: [employer] Medicare, NA  Plan of care expires: For orders: 1/9/2023; For insurance: Oct 05, 2023    Precautions/Hx: HTN, glaucoma, cognitive limitations. SUBJECTIVE:     Pt reports that he continues to have pain in the R side of the neck when he lifts his head from looking down. Visit count 1/9/2023 1/8 2/8 3/8   Date 10/11/2022 10/18/2022  10/21/2022    Neck pain R>L      Pain Range 2 to 7/10 2-7/10 2 to 6-7/10   Pain Ave 3 to 4/10 3-4/10 3/10      OBJECTIVE:     Treatment performed this date:    Therapeutic Exercise:  Visit #   1/8 2/8 3/8   Position Exercise HEP 10/11/2022 10/18/2022  10/21/2022    NuStep Seat 9, Arms 10, Resist 5   X 6 min, 2 intervals X    Supine Median nerve glides H X x     Diastasis hand hold head lift H X x    1/2 foam roll Balancing     X    Scap retraction    X    Horiz abd w wrist extn    X    B shldr abd adri wings    X    B shldr flex    X   Foam roll Balancing     X    Scap retraction    X    Horiz abd wrist extn    X    B shldr abd adri wings    X    B shldr flex    X   Sitting Median nerve glide H X x     Trunk rotation chair H  X     Chin tuck neck retraction    X 10x    Axial extension    X 5x    Cervical rotation    X    Cerv rot w overpressure H   X    Cerv lat flex    X    Cerv lat flex w overpressure H   X   Standing Median nerve glide H  X x     Cat cow H  X    4 point Cat cow H  X     Neuro Posture  X      Re-ed   X see assessment     Man tx Suboccipital release    X    Manual traction    X    MET    X C3 ERSL, C4 ERSR, C6 ERSL   H = HEP. Pt given copies of this exercise for home program.  X = Exercises done this date - pt verbalized understanding and demonstrated competence. All exercises done B unless otherwise indicated. Pt advised to discontinue exercises that increase pain and to call or return to therapist to discuss. Each intervention above is specifically prescribed to address the patients identified impairments, activity limitations, and participation restrictions. ASSESSMENT     Pt noting mild change in pain - reports being consistent w his HEP. Pt did well with upper quadrant mobility on the foam roll. Demonstrates mild loss of B flex and scaption mobility. Pt instructed in cervical self mob and did well with V/T cues. Pt demonstrates segmental mobility limitations as noted above and tolerated manual therapy with >70% improvement in mobility after treatment. Pt will continue to benefit from PT intervention to achieve goals. Physical Therapy Goals:  From 10/11/2022 to 1/9/2023  - Created w patient input during initial assessment  1. Pt will be independent in beginning level of HEP for stretching, posture and strengthening. 2. Pt will be able to reach behind him without increased symptoms. 3. Pt will be able to raise his head from flex to neutral without increased symptoms. 4. Pt will be able to look into upper cabinet without increased symptoms. PLAN OF CARE:      Assess response to MET. Continue PT per original plan for therapeutic exercises, posture retraining, therapeutic activities, manual treatment, neuromuscular reeducation, therapeutic pain neuroscience education, patient education, self care home management, home exercise program, and modalities as needed. Charges: TE2, MT    Total Timed treatment: 40 min      Total Treatment Time: 40 min   _____________________________________________________________________________________________    21st Century Cures Act Notice to Patient: Medical documents like this are made available to patients in the interest of transparency.  However, be advised this is a medical document and it is intended as [id number]-[id number] communication between your medical providers. This medical document may contain abbreviations, assessments, medical data, and results or other terms that are unfamiliar. Medical documents are intended to carry relevant information, facts as evident, and the clinical opinion of the practitioner. As such, this medical document may be written in language that appears blunt or direct. You are encouraged to contact your medical provider and/or [facility] Patient Experience if you have any questions about this medical document. Objective Initial Evaluation Data 10/11/2022:    Functional Scores 10/11/2022   FOTO primary measure 67     Posture:  R shoulder elevated, increased R LE wt bearing, flattened thoracic kyphosis, all kyphosis at C-T junction.      Dermatomes 10/11/2022       Lat neck (C4) R wnl   Lat neck (C4) L wnl   Ant deltoid (C5) R wnl   Ant deltoid (C5) L wnl   Dorsal prox thumb (C6) R wnl   Dorsal prox thumb (C6) L wnl   Dorsal prox 3 digit (C7) R wnl   Dorsal prox 3 digit (C7) L wnl   Dorsal 4-5 digits (C8) R wnl   Dorsal 4-5 digits (C8) L wnl   Med prox elbow (T1) R wnl   Med prox elbow (T1) L wnl       Abdominals 10/11/2022   Tone fair     Diastasis in fingers 10/11/2022   12 cm above umbilicus 3   6 cm above umbilicus 3-4   At umbilicus 3-4   6 cm below umbilicus 3   12 cm below umbilicus 2-3      UE Neural Glides 10/11/2022   ULTT 1 R min   ULTT 1 L min   ULTT 2 R wnl   ULTT 2 L  wnl   ULTT 3 R wnl   ULTT 3 L wnl       Cervical ROM 10/11/2022   Fwd head 29   Flexion 60 nl 47   Extension 70 nl 52   R SB 45 nl 24   L SB 45 nl 31   R Rotation 80 nl 50   L Rotation 80 nl 43   *pain limiting     Shoulder ROM 10/11/2022   Flex R  180 nl 150   Flex L  180 nl 145   Extn R  50 nl 49   Extn L  50 nl 46   Abd R  180 nl 165   Abd L  180 nl 161   ER R  90 nl 105   ER L  90 nl 90   IR R  80 nl 37   IR L  80 nl 55   *pain limiting    MMT 5/5 10/11/2022   C5 shldr abd R 5   Shldr abd L 5   C6 biceps R 5   Biceps L 5   C7 Triceps R 5 Triceps L 5   C8 EPL R 5   EPL L 5   T1 Interossei R 5   Interossei L 5   ER R 5   ER L 5   IR R 5   IR L 5   *pain limiting      UE flexibility 10/11/2022   UT R mod   UT L Mod-max   Scalenes R minmod   Scalenes L mod   Lats R Min-mod   Lats L mod

## 2022-10-24 ENCOUNTER — OFFICE VISIT (OUTPATIENT)
Dept: PHYSICAL THERAPY | Facility: HOSPITAL | Age: 57
End: 2022-10-24
Attending: PHYSICAL MEDICINE & REHABILITATION
Payer: MEDICARE

## 2022-10-24 DIAGNOSIS — M54.2 CERVICALGIA: ICD-10-CM

## 2022-10-24 DIAGNOSIS — M79.18 MYOFASCIAL PAIN: ICD-10-CM

## 2022-10-24 PROCEDURE — 97140 MANUAL THERAPY 1/> REGIONS: CPT

## 2022-10-24 PROCEDURE — 97110 THERAPEUTIC EXERCISES: CPT

## 2022-10-24 NOTE — PROGRESS NOTES
Malena Ramirez    12/12/1965  Referring Physician:  Janae Guy  Diagnosis: Myofascial pain (M79.18)  Cervicalgia (M54.2)    Initial Evaluation Date: 10/11/2022  Date of Surgery: None for this diagnosis    Insurance type and authorized visits: [employer] Medicare, NA  Plan of care expires: For orders: 1/9/2023; For insurance: Oct 05, 2023    Precautions/Hx: HTN, glaucoma, cognitive limitations. SUBJECTIVE:     Pt reports that he still feels pinching pain on the R side of the neck.         Visit count 1/9/2023 1/8 2/8 3/8 4/8   Date 10/11/2022 10/18/2022  10/21/2022  10/24/2022    Neck pain R>L       Pain Range 2 to 7/10 2-7/10 2 to 6-7/10 2 to 6-7/10   Pain Ave 3 to 4/10 3-4/10 3/10 3/10      OBJECTIVE:     Treatment performed this date:    Therapeutic Exercise:  Visit #   2/8 3/8 4/8   Position Exercise HEP 10/18/2022  10/21/2022  10/24/2022    NuStep Seat 9, Arms 10, Resist 5  X 6 min, 2 intervals X     Supine Median nerve glides H x      Diastasis hand hold head lift H x     1/2 foam roll Balancing    X     Scap retraction   X     Horiz abd w wrist extn   X     B shldr abd adri wings   X     B shldr flex   X    Foam roll Balancing    X     Scap retraction   X     Horiz abd wrist extn   X     B shldr abd adri wings   X     B shldr flex   X    Sitting Median nerve glide H x      Trunk rotation chair H X      Chin tuck neck retraction   X 10x     Axial extension   X 5x     Cervical rotation   X X    Cerv rot w overpressure H  X X    Cerv lat flex   X X    Cerv lat flex w overpressure H  X X w head rot positioning    R first rib self mob H   X 10x   Standing Median nerve glide H  x      Cat cow H X     4 point Cat cow H X      Neuro Posture        Re-ed  X see assessment      Man tx Suboccipital release   X     Manual traction   X     MET   X C3 ERSL, C4 ERSR, C6 ERSL X R elevated 1st rib    IASTM    X    MFR    X   US Ultrasound - units given 1  100% power, 1mHz, 1.2 w/cm2, 7 min to      X R UT, ps, lev scap H = HEP. Pt given copies of this exercise for home program.  X = Exercises done this date - pt verbalized understanding and demonstrated competence. All exercises done B unless otherwise indicated. Pt advised to discontinue exercises that increase pain and to call or return to therapist to discuss. Each intervention above is specifically prescribed to address the patients identified impairments, activity limitations, and participation restrictions. ASSESSMENT     Pt continues to have R sided neck pain w pinching pain w R rotation. Pt tolerated instrument assisted soft tissue massage followed by myofascial release after ultrasound as noted above with improved soft tissue mobility. Pt demonstrates dense myofascia w rigid thickened soft tissue in the area. He had good softening w above tx and tolerated myofascial release well with good improvement in mobility. Pt displays elevated R 1st rib and tolerated MET and instruction in self mob well. Discussed slow progress w pt and sister who agree to contact w physician for progression in next step of medical plan of care. Min cues given for previous exercises. Physical Therapy Goals:  From 10/11/2022 to 1/9/2023  - Created w patient input during initial assessment  1. Pt will be independent in beginning level of HEP for stretching, posture and strengthening. 2. Pt will be able to reach behind him without increased symptoms. 3. Pt will be able to raise his head from flex to neutral without increased symptoms. 4. Pt will be able to look into upper cabinet without increased symptoms. PLAN OF CARE:      Assess response to IASTM. Continue PT per original plan for therapeutic exercises, posture retraining, therapeutic activities, manual treatment, neuromuscular reeducation, therapeutic pain neuroscience education, patient education, self care home management, home exercise program, and modalities as needed.     Charges: TE, MT2    Total Timed treatment: 38 min      Total Treatment Time: 50 min   _____________________________________________________________________________________________    21st Century Cures Act Notice to Patient: Medical documents like this are made available to patients in the interest of transparency. However, be advised this is a medical document and it is intended as [id number] communication between your medical providers. This medical document may contain abbreviations, assessments, medical data, and results or other terms that are unfamiliar. Medical documents are intended to carry relevant information, facts as evident, and the clinical opinion of the practitioner. As such, this medical document may be written in language that appears blunt or direct. You are encouraged to contact your medical provider and/or [facility] Patient Experience if you have any questions about this medical document. Objective Initial Evaluation Data 10/11/2022:    Functional Scores 10/11/2022   FOTO primary measure 67     Posture:  R shoulder elevated, increased R LE wt bearing, flattened thoracic kyphosis, all kyphosis at C-T junction.      Dermatomes 10/11/2022       Lat neck (C4) R wnl   Lat neck (C4) L wnl   Ant deltoid (C5) R wnl   Ant deltoid (C5) L wnl   Dorsal prox thumb (C6) R wnl   Dorsal prox thumb (C6) L wnl   Dorsal prox 3 digit (C7) R wnl   Dorsal prox 3 digit (C7) L wnl   Dorsal 4-5 digits (C8) R wnl   Dorsal 4-5 digits (C8) L wnl   Med prox elbow (T1) R wnl   Med prox elbow (T1) L wnl       Abdominals 10/11/2022   Tone fair     Diastasis in fingers 10/11/2022   12 cm above umbilicus 3   6 cm above umbilicus 3-4   At umbilicus 3-4   6 cm below umbilicus 3   12 cm below umbilicus 2-3      UE Neural Glides 10/11/2022   ULTT 1 R min   ULTT 1 L min   ULTT 2 R wnl   ULTT 2 L  wnl   ULTT 3 R wnl   ULTT 3 L wnl       Cervical ROM 10/11/2022   Fwd head 29   Flexion 60 nl 47   Extension 70 nl 52   R SB 45 nl 24   L SB 45 nl 31   R Rotation 80 nl 50   L Rotation 80 nl 43   *pain limiting     Shoulder ROM 10/11/2022   Flex R  180 nl 150   Flex L  180 nl 145   Extn R  50 nl 49   Extn L  50 nl 46   Abd R  180 nl 165   Abd L  180 nl 161   ER R  90 nl 105   ER L  90 nl 90   IR R  80 nl 37   IR L  80 nl 55   *pain limiting    MMT 5/5 10/11/2022   C5 shldr abd R 5   Shldr abd L 5   C6 biceps R 5   Biceps L 5   C7 Triceps R 5   Triceps L 5   C8 EPL R 5   EPL L 5   T1 Interossei R 5   Interossei L 5   ER R 5   ER L 5   IR R 5   IR L 5   *pain limiting      UE flexibility 10/11/2022   UT R mod   UT L Mod-max   Scalenes R minmod   Scalenes L mod   Lats R Min-mod   Lats L mod

## 2022-10-28 ENCOUNTER — OFFICE VISIT (OUTPATIENT)
Dept: PHYSICAL THERAPY | Facility: HOSPITAL | Age: 57
End: 2022-10-28
Attending: PHYSICAL MEDICINE & REHABILITATION
Payer: MEDICARE

## 2022-10-28 DIAGNOSIS — M54.2 CERVICALGIA: ICD-10-CM

## 2022-10-28 DIAGNOSIS — M79.18 MYOFASCIAL PAIN: ICD-10-CM

## 2022-10-28 PROCEDURE — 97112 NEUROMUSCULAR REEDUCATION: CPT

## 2022-10-28 PROCEDURE — 97110 THERAPEUTIC EXERCISES: CPT

## 2022-10-28 PROCEDURE — 97140 MANUAL THERAPY 1/> REGIONS: CPT

## 2022-10-28 NOTE — PROGRESS NOTES
Danial Kat    12/12/1965  Referring Physician:  Kayli Flaherty  Diagnosis: Myofascial pain (M79.18)  Cervicalgia (M54.2)    Initial Evaluation Date: 10/11/2022  Date of Surgery: None for this diagnosis    Insurance type and authorized visits: [employer] Medicare, NA  Plan of care expires: For orders: 1/9/2023; For insurance: Oct 05, 2023    Precautions/Hx: HTN, glaucoma, cognitive limitations.     SUBJECTIVE:     Pt reports that he feels looser now, but    Visit count 1/9/2023 1/8 2/8 3/8 4/8 5/8   Date 10/11/2022 10/18/2022  10/21/2022  10/24/2022  10/28/2022    Neck pain R>L        Pain Range 2 to 7/10 2-7/10 2 to 6-7/10 2 to 6-7/10 0-4/10   Pain Ave 3 to 4/10 3-4/10 3/10 3/10       OBJECTIVE:     Treatment performed this date:    Therapeutic Exercise:  Visit #   3/8 4/8 5/8   Position Exercise HEP 10/21/2022  10/24/2022  10/28/2022    NuStep Seat 9, Arms 10, Resist 5  X      Supine Median nerve glides H       Diastasis hand hold head lift H      1/2 foam roll Balancing   X      Scap retraction  X      Horiz abd w wrist extn  X      B shldr abd adri wings  X      B shldr flex  X     Foam roll Balancing   X      Scap retraction  X      Horiz abd wrist extn  X      B shldr abd adri wings  X      B shldr flex  X     Sitting Median nerve glide H       Trunk rotation chair H       Chin tuck neck retraction  X 10x      Axial extension  X 5x      Cervical rotation  X X     Cerv rot w overpressure H X X     Cerv lat flex  X X     Cerv lat flex w overpressure H X X w head rot positioning     R first rib self mob H  X 10x     Pelvic tilt    X    P tilt w thoracic flexion H   X   Standing Median nerve glide H        Cat cow H      4 point Cat cow H      Neuro Posture        Re-ed       Man tx Suboccipital release  X      Manual traction  X      MET  X C3 ERSL, C4 ERSR, C6 ERSL X R elevated 1st rib     IASTM   X X    MFR   X X   US Ultrasound - units given 1  100% power, 1mHz, 1.2 w/cm2, 7 min to     X R UT, ps, lev scap X R UT, ps, lev scap   H = HEP. Pt given copies of this exercise for home program.  X = Exercises done this date - pt verbalized understanding and demonstrated competence. All exercises done B unless otherwise indicated. Pt advised to discontinue exercises that increase pain and to call or return to therapist to discuss. Each intervention above is specifically prescribed to address the patients identified impairments, activity limitations, and participation restrictions. ASSESSMENT     Pt noted some pain relief from last tx. Pt discussed that he would likely be watching TV w his sister all weekend. Discussed the importance of frequent mobility w goal of at least standing and possible walking in his home every 30 min. Discussed the detriments of prolonged sitting to global health. Pt verbalized understanding. Pt demonstrates improved cervical shoulder soft tissue mobility from last visit w mod restrictions R>>L remaining. He again tolerated man tx as noted above. Pt demonstrates loss of mid thoracic flexion mobility. Pt educated on loss of mobility w use of photo and verbalized understanding. Pt instructed in spinal flex w verbal and tactile cueing to gain mobility awareness. Physical Therapy Goals:  From 10/11/2022 to 1/9/2023  - Created w patient input during initial assessment  1. Pt will be independent in beginning level of HEP for stretching, posture and strengthening. 2. Pt will be able to reach behind him without increased symptoms. 3. Pt will be able to raise his head from flex to neutral without increased symptoms. 4. Pt will be able to look into upper cabinet without increased symptoms. PLAN OF CARE:      Assess response to thoracic self mob.   Continue PT per original plan for therapeutic exercises, posture retraining, therapeutic activities, manual treatment, neuromuscular reeducation, therapeutic pain neuroscience education, patient education, self care home management, home exercise program, and modalities as needed. Charges: TE, MT, Neuro  Total Timed treatment: 38 min      Total Treatment Time: 50 min   _____________________________________________________________________________________________    21st Century Cures Act Notice to Patient: Medical documents like this are made available to patients in the interest of transparency. However, be advised this is a medical document and it is intended as [id number] communication between your medical providers. This medical document may contain abbreviations, assessments, medical data, and results or other terms that are unfamiliar. Medical documents are intended to carry relevant information, facts as evident, and the clinical opinion of the practitioner. As such, this medical document may be written in language that appears blunt or direct. You are encouraged to contact your medical provider and/or [facility] Patient Experience if you have any questions about this medical document. Objective Initial Evaluation Data 10/11/2022:    Functional Scores 10/11/2022   FOTO primary measure 67     Posture:  R shoulder elevated, increased R LE wt bearing, flattened thoracic kyphosis, all kyphosis at C-T junction.      Dermatomes 10/11/2022       Lat neck (C4) R wnl   Lat neck (C4) L wnl   Ant deltoid (C5) R wnl   Ant deltoid (C5) L wnl   Dorsal prox thumb (C6) R wnl   Dorsal prox thumb (C6) L wnl   Dorsal prox 3 digit (C7) R wnl   Dorsal prox 3 digit (C7) L wnl   Dorsal 4-5 digits (C8) R wnl   Dorsal 4-5 digits (C8) L wnl   Med prox elbow (T1) R wnl   Med prox elbow (T1) L wnl       Abdominals 10/11/2022   Tone fair     Diastasis in fingers 10/11/2022   12 cm above umbilicus 3   6 cm above umbilicus 3-4   At umbilicus 3-4   6 cm below umbilicus 3   12 cm below umbilicus 2-3      UE Neural Glides 10/11/2022   ULTT 1 R min   ULTT 1 L min   ULTT 2 R wnl   ULTT 2 L  wnl   ULTT 3 R wnl   ULTT 3 L wnl       Cervical ROM 10/11/2022   Fwd head 29   Flexion 60 nl 47   Extension 70 nl 52   R SB 45 nl 24   L SB 45 nl 31   R Rotation 80 nl 50   L Rotation 80 nl 43   *pain limiting     Shoulder ROM 10/11/2022   Flex R  180 nl 150   Flex L  180 nl 145   Extn R  50 nl 49   Extn L  50 nl 46   Abd R  180 nl 165   Abd L  180 nl 161   ER R  90 nl 105   ER L  90 nl 90   IR R  80 nl 37   IR L  80 nl 55   *pain limiting    MMT 5/5 10/11/2022   C5 shldr abd R 5   Shldr abd L 5   C6 biceps R 5   Biceps L 5   C7 Triceps R 5   Triceps L 5   C8 EPL R 5   EPL L 5   T1 Interossei R 5   Interossei L 5   ER R 5   ER L 5   IR R 5   IR L 5   *pain limiting      UE flexibility 10/11/2022   UT R mod   UT L Mod-max   Scalenes R minmod   Scalenes L mod   Lats R Min-mod   Lats L mod

## 2022-11-04 ENCOUNTER — APPOINTMENT (OUTPATIENT)
Dept: PHYSICAL THERAPY | Facility: HOSPITAL | Age: 57
End: 2022-11-04
Attending: PHYSICAL MEDICINE & REHABILITATION
Payer: MEDICARE

## 2022-11-04 ENCOUNTER — TELEPHONE (OUTPATIENT)
Dept: PHYSICAL THERAPY | Facility: HOSPITAL | Age: 57
End: 2022-11-04

## 2022-11-11 ENCOUNTER — OFFICE VISIT (OUTPATIENT)
Dept: PHYSICAL THERAPY | Facility: HOSPITAL | Age: 57
End: 2022-11-11
Attending: PHYSICAL MEDICINE & REHABILITATION
Payer: MEDICARE

## 2022-11-11 ENCOUNTER — LAB ENCOUNTER (OUTPATIENT)
Dept: LAB | Facility: HOSPITAL | Age: 57
End: 2022-11-11
Attending: UROLOGY
Payer: MEDICARE

## 2022-11-11 DIAGNOSIS — R97.20 ELEVATED PSA: ICD-10-CM

## 2022-11-11 LAB — PSA SERPL-MCNC: 9.37 NG/ML (ref ?–4)

## 2022-11-11 PROCEDURE — 36415 COLL VENOUS BLD VENIPUNCTURE: CPT

## 2022-11-11 PROCEDURE — 84153 ASSAY OF PSA TOTAL: CPT

## 2022-11-11 PROCEDURE — 97140 MANUAL THERAPY 1/> REGIONS: CPT

## 2022-11-11 PROCEDURE — 97112 NEUROMUSCULAR REEDUCATION: CPT

## 2022-11-11 PROCEDURE — 97110 THERAPEUTIC EXERCISES: CPT

## 2022-11-11 NOTE — PROGRESS NOTES
Valeria Hamilton    12/12/1965  Referring Physician:  Lee Ann Kaplan  Diagnosis: Myofascial pain (M79.18)  Cervicalgia (M54.2)    Initial Evaluation Date: 10/11/2022  Date of Surgery: None for this diagnosis    Insurance type and authorized visits: [employer] Medicare, NA  Plan of care expires: For orders: 1/9/2023; For insurance: Oct 05, 2023    Precautions/Hx: HTN, glaucoma, cognitive limitations. SUBJECTIVE:     Pt reports that he is about the same. He states that he is doing the neck exercises. He has less pain with returning his head to neutral after looking down.      Visit count 1/9/2023 1/8 2/8 3/8 4/8 5/8 6/8   Date 10/11/2022 10/18/2022  10/21/2022  10/24/2022  10/28/2022  11/11/2022    Neck pain R>L         Pain Range 2 to 7/10 2-7/10 2 to 6-7/10 2 to 6-7/10 0-4/10 0-4/10   Pain Ave 3 to 4/10 3-4/10 3/10 3/10 --- 3/10      OBJECTIVE:     Treatment performed this date:    Therapeutic Exercise:  Visit #   4/8 5/8 6/8   Position Exercise HEP 10/24/2022  10/28/2022  11/11/2022    NuStep Seat 9, Arms 10, Resist 5       Supine Median nerve glides H       Diastasis hand hold head lift H      1/2 foam roll Balancing         Scap retraction        Horiz abd w wrist extn        B shldr abd adri wings        B shldr flex       Foam roll Balancing         Scap retraction        Horiz abd wrist extn        B shldr abd adri wings        B shldr flex       Sidelying Trunk rot book H   X 5x10s   Sitting Median nerve glide H       Trunk rotation chair H   X 5x10s    Chin tuck neck retraction        Axial extension        Cervical rotation  X      Cerv rot w overpressure H X  X     Cerv lat flex  X      Cerv lat flex w overpressure H X w head rot positioning  X     R first rib self mob H X 10x      Pelvic tilt   X     P tilt w thoracic flexion H  X     TA set w exhale  H   X cueing needed 10x   Standing Median nerve glide H        Cat cow H      4 point Cat cow H      Neuro Posture        Re-ed    X see assessment Man tx Suboccipital release        Manual traction        MET  X R elevated 1st rib      IASTM  X X     MFR  X X     Brachial chain -  upper and lower, B combo upper/lower w hold and soft diaphragmatic breathing    X   US Ultrasound - units given 1  100% power, 1mHz, 1.2 w/cm2, 7 min to    X R UT, ps, lev scap X R UT, ps, lev scap            H = HEP. Pt given copies of this exercise for home program.  X = Exercises done this date - pt verbalized understanding and demonstrated competence. All exercises done B unless otherwise indicated. Pt advised to discontinue exercises that increase pain and to call or return to therapist to discuss. Each intervention above is specifically prescribed to address the patients identified impairments, activity limitations, and participation restrictions. ASSESSMENT     Pt remains about the same with continued decrease of max, min and ave pain levels. Pt demonstrates B chest wall mod level of rigidity w poor exhale - upper ribs also have poor inhale. Pt and sister educated on the importance of rib and thoracic mobility for cervical health. Pt verbalized understanding. Pt educated on the importance of correct diaphragm control and positioning to gain core abdominal control and neutral lumbopelvic and cervicothoracic alignment. Pt verbalized understanding. Pt shown video of athlete demonstrating exhale control w abdominal activation. Reviewed importance of thoracic rotation mobility for cervical spine health. Physical Therapy Goals:  From 10/11/2022 to 1/9/2023  - Created w patient input during initial assessment  1. Pt will be independent in beginning level of HEP for stretching, posture and strengthening. 2. Pt will be able to reach behind him without increased symptoms. 3. Pt will be able to raise his head from flex to neutral without increased symptoms. 4. Pt will be able to look into upper cabinet without increased symptoms.      PLAN OF CARE:      Assess response to brachial chain mobilization. Continue PT per original plan for therapeutic exercises, posture retraining, therapeutic activities, manual treatment, neuromuscular reeducation, therapeutic pain neuroscience education, patient education, self care home management, home exercise program, and modalities as needed. Charges: TE, MT, Neuro  Total Timed treatment: 45 min      Total Treatment Time: 45 min   _____________________________________________________________________________________________    21st Century Cures Act Notice to Patient: Medical documents like this are made available to patients in the interest of transparency. However, be advised this is a medical document and it is intended as iwdb-gc-umne communication between your medical providers. This medical document may contain abbreviations, assessments, medical data, and results or other terms that are unfamiliar. Medical documents are intended to carry relevant information, facts as evident, and the clinical opinion of the practitioner. As such, this medical document may be written in language that appears blunt or direct. You are encouraged to contact your medical provider and/or [facility] if you have any questions about this medical document. Objective Initial Evaluation Data 10/11/2022:    Functional Scores 10/11/2022   FOTO primary measure 67     Posture:  R shoulder elevated, increased R LE wt bearing, flattened thoracic kyphosis, all kyphosis at C-T junction.      Dermatomes 10/11/2022       Lat neck (C4) R wnl   Lat neck (C4) L wnl   Ant deltoid (C5) R wnl   Ant deltoid (C5) L wnl   Dorsal prox thumb (C6) R wnl   Dorsal prox thumb (C6) L wnl   Dorsal prox 3 digit (C7) R wnl   Dorsal prox 3 digit (C7) L wnl   Dorsal 4-5 digits (C8) R wnl   Dorsal 4-5 digits (C8) L wnl   Med prox elbow (T1) R wnl   Med prox elbow (T1) L wnl       Abdominals 10/11/2022   Tone fair     Diastasis in fingers 10/11/2022   12 cm above umbilicus 3   6 cm above umbilicus 3-4   At umbilicus 3-4   6 cm below umbilicus 3   12 cm below umbilicus 2-3      UE Neural Glides 10/11/2022   ULTT 1 R min   ULTT 1 L min   ULTT 2 R wnl   ULTT 2 L  wnl   ULTT 3 R wnl   ULTT 3 L wnl       Cervical ROM 10/11/2022   Fwd head 29   Flexion 60 nl 47   Extension 70 nl 52   R SB 45 nl 24   L SB 45 nl 31   R Rotation 80 nl 50   L Rotation 80 nl 43   *pain limiting     Shoulder ROM 10/11/2022   Flex R  180 nl 150   Flex L  180 nl 145   Extn R  50 nl 49   Extn L  50 nl 46   Abd R  180 nl 165   Abd L  180 nl 161   ER R  90 nl 105   ER L  90 nl 90   IR R  80 nl 37   IR L  80 nl 55   *pain limiting    MMT 5/5 10/11/2022   C5 shldr abd R 5   Shldr abd L 5   C6 biceps R 5   Biceps L 5   C7 Triceps R 5   Triceps L 5   C8 EPL R 5   EPL L 5   T1 Interossei R 5   Interossei L 5   ER R 5   ER L 5   IR R 5   IR L 5   *pain limiting      UE flexibility 10/11/2022   UT R mod   UT L Mod-max   Scalenes R minmod   Scalenes L mod   Lats R Min-mod   Lats L mod

## 2022-11-14 ENCOUNTER — TELEPHONE (OUTPATIENT)
Dept: SURGERY | Facility: CLINIC | Age: 57
End: 2022-11-14

## 2022-11-15 ENCOUNTER — OFFICE VISIT (OUTPATIENT)
Dept: PHYSICAL THERAPY | Facility: HOSPITAL | Age: 57
End: 2022-11-15
Attending: PHYSICAL MEDICINE & REHABILITATION
Payer: MEDICARE

## 2022-11-15 PROCEDURE — 97110 THERAPEUTIC EXERCISES: CPT

## 2022-11-15 PROCEDURE — 97112 NEUROMUSCULAR REEDUCATION: CPT

## 2022-11-15 NOTE — PROGRESS NOTES
Genita Rubinstein    12/12/1965  Referring Physician:  Terese Ward  Diagnosis: Myofascial pain (M79.18)  Cervicalgia (M54.2)    Initial Evaluation Date: 10/11/2022  Date of Surgery: None for this diagnosis    Insurance type and authorized visits: [facility], 6 visits from 11/14/2022 to 2/14/2023  Plan of care expires: For orders: 1/9/2023; For insurance: Oct 05, 2023    Precautions/Hx: HTN, glaucoma, cognitive limitations. SUBJECTIVE:     Pt reports that he continues to feel about the same. He reports doing his HEP daily.       Visit count 1/9/2023 1/8 2/8 3/8 4/8 5/8 6/8 7/8   Date 10/11/2022 10/18/2022  10/21/2022  10/24/2022  10/28/2022  11/11/2022  11/15/2022    Neck pain R>L          Pain Range 2 to 7/10 2-7/10 2 to 6-7/10 2 to 6-7/10 0-4/10 0-4/10 0-4/10   Pain Ave 3 to 4/10 3-4/10 3/10 3/10 --- 3/10 3/10      OBJECTIVE:     Treatment performed this date:    Therapeutic Exercise:  Visit #   5/8 6/8 7/8   Position Exercise HEP 10/28/2022  11/11/2022  11/15/2022    NuStep Seat 9, Arms 10, Resist 5       Supine Median nerve glides H       Diastasis hand hold head lift H       TB diagonal abd    X w scap control red    TB horiz abd     X w scap control red   1/2 foam roll Balancing     X 1 min    Scap retraction    X 10x    Horiz abd w wrist extn    X 10x    B shldr abd adri wings    X 10x    B shldr flex       Foam roll Balancing         Scap retraction        Horiz abd wrist extn        B shldr abd adri wings        B shldr flex       Sidelying Trunk rot book H  X 5x10s    Sitting Median nerve glide H       Trunk rotation chair H  X 5x10s     Chin tuck neck retraction        Axial extension        Cervical rotation        Cerv rot w overpressure H  X      Cerv lat flex        Cerv lat flex w overpressure H  X      R first rib self mob H       Pelvic tilt  X      P tilt w thoracic flexion H X      TA set w exhale  H  X cueing needed 10x     Pulleys    X 3 min   Standing Median nerve glide H Cat cow H       Pec       4 point Cat cow H      Neuro Posture        Re-ed   X see assessment      Cueing    X   Prone B shldr extn    X    B shldr horiz abd T's    X    B shldr Y's       Man tx Suboccipital release        Manual traction        MET        IASTM  X      MFR  X      Brachial chain -  upper and lower, B combo upper/lower w hold and soft diaphragmatic breathing   X    US Ultrasound - units given 1  100% power, 1mHz, 1.2 w/cm2, 7 min to    X R UT, ps, lev scap             H = HEP. Pt given copies of this exercise for home program.  X = Exercises done this date - pt verbalized understanding and demonstrated competence. All exercises done B unless otherwise indicated. Pt advised to discontinue exercises that increase pain and to call or return to therapist to discuss. Each intervention above is specifically prescribed to address the patients identified impairments, activity limitations, and participation restrictions. ASSESSMENT     Pt remains improved, but with limited recent gains. Pt demonstrates good scapular strength, but tendency to overuse scapular elevation. Pt improves with consistent V/T cues during exercises as noted above. Physical Therapy Goals:  From 10/11/2022 to 1/9/2023  - Created w patient input during initial assessment  1. Pt will be independent in beginning level of HEP for stretching, posture and strengthening. 2. Pt will be able to reach behind him without increased symptoms. 3. Pt will be able to raise his head from flex to neutral without increased symptoms. 4. Pt will be able to look into upper cabinet without increased symptoms. PLAN OF CARE:      Assess response to band exercises.   Continue PT per original plan for therapeutic exercises, posture retraining, therapeutic activities, manual treatment, neuromuscular reeducation, therapeutic pain neuroscience education, patient education, self care home management, home exercise program, and modalities as needed. Charges: TE2, Neuro  Total Timed treatment: 45 min      Total Treatment Time: 45 min   _____________________________________________________________________________________________    21st Century Cures Act Notice to Patient: Medical documents like this are made available to patients in the interest of transparency. However, be advised this is a medical document and it is intended as esde-az-uosm communication between your medical providers. This medical document may contain abbreviations, assessments, medical data, and results or other terms that are unfamiliar. Medical documents are intended to carry relevant information, facts as evident, and the clinical opinion of the practitioner. As such, this medical document may be written in language that appears blunt or direct. You are encouraged to contact your medical provider and/or [facility] Patient Experience if you have any questions about this medical document. Objective Initial Evaluation Data 10/11/2022:    Functional Scores 10/11/2022   FOTO primary measure 67     Posture:  R shoulder elevated, increased R LE wt bearing, flattened thoracic kyphosis, all kyphosis at C-T junction.      Dermatomes 10/11/2022       Lat neck (C4) R wnl   Lat neck (C4) L wnl   Ant deltoid (C5) R wnl   Ant deltoid (C5) L wnl   Dorsal prox thumb (C6) R wnl   Dorsal prox thumb (C6) L wnl   Dorsal prox 3 digit (C7) R wnl   Dorsal prox 3 digit (C7) L wnl   Dorsal 4-5 digits (C8) R wnl   Dorsal 4-5 digits (C8) L wnl   Med prox elbow (T1) R wnl   Med prox elbow (T1) L wnl       Abdominals 10/11/2022   Tone fair     Diastasis in fingers 10/11/2022   12 cm above umbilicus 3   6 cm above umbilicus 3-4   At umbilicus 3-4   6 cm below umbilicus 3   12 cm below umbilicus 2-3      UE Neural Glides 10/11/2022   ULTT 1 R min   ULTT 1 L min   ULTT 2 R wnl   ULTT 2 L  wnl   ULTT 3 R wnl   ULTT 3 L wnl       Cervical ROM 10/11/2022   Fwd head 29   Flexion 60 nl 47   Extension 70 nl 52   R SB 45 nl 24   L SB 45 nl 31   R Rotation 80 nl 50   L Rotation 80 nl 43   *pain limiting     Shoulder ROM 10/11/2022   Flex R  180 nl 150   Flex L  180 nl 145   Extn R  50 nl 49   Extn L  50 nl 46   Abd R  180 nl 165   Abd L  180 nl 161   ER R  90 nl 105   ER L  90 nl 90   IR R  80 nl 37   IR L  80 nl 55   *pain limiting    MMT 5/5 10/11/2022   C5 shldr abd R 5   Shldr abd L 5   C6 biceps R 5   Biceps L 5   C7 Triceps R 5   Triceps L 5   C8 EPL R 5   EPL L 5   T1 Interossei R 5   Interossei L 5   ER R 5   ER L 5   IR R 5   IR L 5   *pain limiting      UE flexibility 10/11/2022   UT R mod   UT L Mod-max   Scalenes R minmod   Scalenes L mod   Lats R Min-mod   Lats L mod        11/15/2022    Pectorals R    Pectorals L

## 2022-11-17 NOTE — TELEPHONE ENCOUNTER
Patient needs to be scheduled for transrectal ultrasound and prostate biopsy in the office given the fact that his PSA continues to be elevated and is actually increased since its prior value. If the patient is agreeable please schedule him and nursing staff may send prophylactic antibiotics as per standard. Explained to the patient the minimal risks of a biopsy including bleeding and infection.

## 2022-11-29 ENCOUNTER — APPOINTMENT (OUTPATIENT)
Dept: PHYSICAL THERAPY | Facility: HOSPITAL | Age: 57
End: 2022-11-29
Attending: PHYSICAL MEDICINE & REHABILITATION
Payer: MEDICARE

## 2022-11-29 ENCOUNTER — TELEPHONE (OUTPATIENT)
Dept: PHYSICAL THERAPY | Facility: HOSPITAL | Age: 57
End: 2022-11-29

## 2022-12-06 ENCOUNTER — OFFICE VISIT (OUTPATIENT)
Dept: PHYSICAL MEDICINE AND REHAB | Facility: CLINIC | Age: 57
End: 2022-12-06
Payer: MEDICARE

## 2022-12-06 VITALS
WEIGHT: 203 LBS | HEIGHT: 66 IN | HEART RATE: 92 BPM | OXYGEN SATURATION: 98 % | BODY MASS INDEX: 32.62 KG/M2 | SYSTOLIC BLOOD PRESSURE: 128 MMHG | DIASTOLIC BLOOD PRESSURE: 80 MMHG

## 2022-12-06 DIAGNOSIS — M54.2 CERVICALGIA: ICD-10-CM

## 2022-12-06 DIAGNOSIS — M79.18 MYOFASCIAL PAIN: Primary | ICD-10-CM

## 2022-12-06 PROCEDURE — 99214 OFFICE O/P EST MOD 30 MIN: CPT | Performed by: PHYSICAL MEDICINE & REHABILITATION

## 2022-12-06 PROCEDURE — 20552 NJX 1/MLT TRIGGER POINT 1/2: CPT | Performed by: PHYSICAL MEDICINE & REHABILITATION

## 2022-12-06 NOTE — TELEPHONE ENCOUNTER
I called pt, sister, Manri Smith answered verified name/ she states she scheduled pt for office visit with Dr. Patsy Moon for 23 to discuss the PSA, I informed her that I was calling because Dr. Gene Kurtz said that we can just schedule pt for the transrectal ultrasound with prostate biopsies over the phone instead of pt coming into the office to set this up. Marni Smith states her other brother will bring him they would like to keep the office as scheduled in 2023 and discuss with Dr. Gene Kurtz.

## 2022-12-07 RX ORDER — LIDOCAINE HYDROCHLORIDE 10 MG/ML
4 INJECTION, SOLUTION INFILTRATION; PERINEURAL ONCE
Status: COMPLETED | OUTPATIENT
Start: 2022-12-07 | End: 2022-12-07

## 2022-12-07 NOTE — PROCEDURES
Trigger point injection  Location:  right upper trapezius and posterior scalene  After discussing benefits and possible side effects, we proceeded with a trigger point injection. The patient was consented. The patient was seated, and the muscle was palpated. The skin was sterilely prepped. Using aseptic technique and a fanning motion, 2 cc of 1% lidocaine was injected into each trigger point. The patient tolerated the procedure well without adverse effects.

## 2022-12-09 ENCOUNTER — TELEPHONE (OUTPATIENT)
Dept: NEUROLOGY | Facility: CLINIC | Age: 57
End: 2022-12-09

## 2022-12-09 NOTE — TELEPHONE ENCOUNTER
Trigger point injection right trapezius and posterior scalene    CPT CODE: 09215, (92510 if needed), , 92734    Status: Pre authorization is not required    Cohere online for authorization of approval for above. Notification or Prior Authorization is not required for the requested services.       Notified Approved Referrals for scheduling

## 2022-12-11 DIAGNOSIS — I10 ESSENTIAL HYPERTENSION: ICD-10-CM

## 2022-12-12 RX ORDER — AMLODIPINE BESYLATE 10 MG/1
TABLET ORAL
Qty: 90 TABLET | Refills: 0 | Status: SHIPPED | OUTPATIENT
Start: 2022-12-12

## 2022-12-13 ENCOUNTER — APPOINTMENT (OUTPATIENT)
Dept: PHYSICAL THERAPY | Facility: HOSPITAL | Age: 57
End: 2022-12-13
Attending: PHYSICAL MEDICINE & REHABILITATION
Payer: MEDICARE

## 2022-12-15 ENCOUNTER — TELEPHONE (OUTPATIENT)
Dept: FAMILY MEDICINE CLINIC | Facility: CLINIC | Age: 57
End: 2022-12-15

## 2022-12-15 ENCOUNTER — OFFICE VISIT (OUTPATIENT)
Dept: FAMILY MEDICINE CLINIC | Facility: CLINIC | Age: 57
End: 2022-12-15
Payer: MEDICARE

## 2022-12-15 VITALS
WEIGHT: 217 LBS | BODY MASS INDEX: 34.87 KG/M2 | HEIGHT: 66 IN | DIASTOLIC BLOOD PRESSURE: 74 MMHG | HEART RATE: 92 BPM | OXYGEN SATURATION: 97 % | SYSTOLIC BLOOD PRESSURE: 130 MMHG

## 2022-12-15 DIAGNOSIS — H34.9 RETINAL ARTERY OCCLUSION: ICD-10-CM

## 2022-12-15 DIAGNOSIS — Z12.11 SCREENING FOR MALIGNANT NEOPLASM OF COLON: Primary | ICD-10-CM

## 2022-12-15 DIAGNOSIS — Z00.00 ADULT GENERAL MEDICAL EXAMINATION: ICD-10-CM

## 2022-12-15 DIAGNOSIS — Z28.21 INFLUENZA VACCINATION DECLINED BY PATIENT: ICD-10-CM

## 2022-12-15 DIAGNOSIS — I10 ESSENTIAL HYPERTENSION: ICD-10-CM

## 2022-12-15 PROCEDURE — 99214 OFFICE O/P EST MOD 30 MIN: CPT | Performed by: NURSE PRACTITIONER

## 2022-12-15 RX ORDER — ASPIRIN 81 MG/1
81 TABLET ORAL DAILY
Qty: 90 TABLET | Refills: 1 | Status: SHIPPED | OUTPATIENT
Start: 2022-12-15

## 2022-12-15 RX ORDER — AMLODIPINE BESYLATE 10 MG/1
10 TABLET ORAL DAILY
Qty: 90 TABLET | Refills: 0 | Status: SHIPPED | OUTPATIENT
Start: 2022-12-15

## 2022-12-27 ENCOUNTER — APPOINTMENT (OUTPATIENT)
Dept: PHYSICAL THERAPY | Facility: HOSPITAL | Age: 57
End: 2022-12-27
Attending: PHYSICAL MEDICINE & REHABILITATION
Payer: MEDICARE

## 2023-01-31 ENCOUNTER — TELEPHONE (OUTPATIENT)
Dept: FAMILY MEDICINE CLINIC | Facility: CLINIC | Age: 58
End: 2023-01-31

## 2023-01-31 DIAGNOSIS — H34.9 RETINAL ARTERY OCCLUSION: Primary | ICD-10-CM

## 2023-01-31 DIAGNOSIS — Z00.00 ADULT GENERAL MEDICAL EXAMINATION: ICD-10-CM

## 2023-01-31 NOTE — TELEPHONE ENCOUNTER
Pt requested referral to Dr Julito Nunez  optometrist  for 02/06 /23 visit  please fax to [phone]

## 2023-02-02 ENCOUNTER — TELEPHONE (OUTPATIENT)
Dept: FAMILY MEDICINE CLINIC | Facility: CLINIC | Age: 58
End: 2023-02-02

## 2023-02-13 ENCOUNTER — TELEPHONE (OUTPATIENT)
Dept: FAMILY MEDICINE CLINIC | Facility: CLINIC | Age: 58
End: 2023-02-13

## 2023-02-13 DIAGNOSIS — H34.9 RETINAL ARTERY OCCLUSION: Primary | ICD-10-CM

## 2023-02-13 NOTE — TELEPHONE ENCOUNTER
[address], [address]  IG#[phone]  [facility]#[phone]     Received referral but the provider's name that is listed is not the right provider, was listed as \" [address] [place] [address] [address] [place]\" needs to be corrected to \"Dr Heidy Medina [address]"    Thank you

## 2023-02-27 ENCOUNTER — TELEPHONE (OUTPATIENT)
Dept: FAMILY MEDICINE CLINIC | Facility: CLINIC | Age: 58
End: 2023-02-27

## 2023-02-27 DIAGNOSIS — R97.20 ELEVATED PSA: Primary | ICD-10-CM

## 2023-02-27 NOTE — TELEPHONE ENCOUNTER
Pt sister Yumiko Hayward called to request referral for Urologist Dr. Lane Downing. Please referral to fax # [phone]. Please call Yumiko Hayward when faxed.

## 2023-03-21 ENCOUNTER — OFFICE VISIT (OUTPATIENT)
Dept: SURGERY | Facility: CLINIC | Age: 58
End: 2023-03-21

## 2023-03-21 DIAGNOSIS — R97.20 ELEVATED PSA: Primary | ICD-10-CM

## 2023-03-21 DIAGNOSIS — R36.9 URETHRAL DISCHARGE: ICD-10-CM

## 2023-03-21 PROCEDURE — 99213 OFFICE O/P EST LOW 20 MIN: CPT | Performed by: UROLOGY

## 2023-03-21 RX ORDER — DOXYCYCLINE 100 MG/1
100 TABLET ORAL 2 TIMES DAILY
Qty: 20 TABLET | Refills: 0 | Status: SHIPPED | OUTPATIENT
Start: 2023-03-21 | End: 2023-03-31

## 2023-05-25 ENCOUNTER — LAB ENCOUNTER (OUTPATIENT)
Dept: LAB | Facility: REFERENCE LAB | Age: 58
End: 2023-05-25
Attending: NURSE PRACTITIONER
Payer: MEDICARE

## 2023-05-25 ENCOUNTER — OFFICE VISIT (OUTPATIENT)
Dept: FAMILY MEDICINE CLINIC | Facility: CLINIC | Age: 58
End: 2023-05-25
Payer: MEDICARE

## 2023-05-25 VITALS
WEIGHT: 209 LBS | BODY MASS INDEX: 33.59 KG/M2 | RESPIRATION RATE: 16 BRPM | DIASTOLIC BLOOD PRESSURE: 70 MMHG | HEIGHT: 66 IN | OXYGEN SATURATION: 99 % | HEART RATE: 102 BPM | TEMPERATURE: 98 F | SYSTOLIC BLOOD PRESSURE: 120 MMHG

## 2023-05-25 DIAGNOSIS — G89.29 CHRONIC MIDLINE LOW BACK PAIN WITH BILATERAL SCIATICA: ICD-10-CM

## 2023-05-25 DIAGNOSIS — M54.41 CHRONIC MIDLINE LOW BACK PAIN WITH BILATERAL SCIATICA: ICD-10-CM

## 2023-05-25 DIAGNOSIS — R20.0 NUMBNESS AND TINGLING: ICD-10-CM

## 2023-05-25 DIAGNOSIS — M79.18 MYOFASCIAL PAIN: ICD-10-CM

## 2023-05-25 DIAGNOSIS — Z00.00 ADULT GENERAL MEDICAL EXAMINATION: Primary | ICD-10-CM

## 2023-05-25 DIAGNOSIS — I10 ESSENTIAL HYPERTENSION: ICD-10-CM

## 2023-05-25 DIAGNOSIS — H34.9 RETINAL ARTERY OCCLUSION: ICD-10-CM

## 2023-05-25 DIAGNOSIS — M54.2 CERVICALGIA: ICD-10-CM

## 2023-05-25 DIAGNOSIS — Z23 ENCOUNTER FOR IMMUNIZATION: ICD-10-CM

## 2023-05-25 DIAGNOSIS — R20.2 NUMBNESS AND TINGLING: ICD-10-CM

## 2023-05-25 DIAGNOSIS — R97.20 ELEVATED PSA: ICD-10-CM

## 2023-05-25 DIAGNOSIS — M54.42 CHRONIC MIDLINE LOW BACK PAIN WITH BILATERAL SCIATICA: ICD-10-CM

## 2023-05-25 DIAGNOSIS — Z12.11 SCREENING FOR MALIGNANT NEOPLASM OF COLON: ICD-10-CM

## 2023-05-25 DIAGNOSIS — Z00.00 ADULT GENERAL MEDICAL EXAMINATION: ICD-10-CM

## 2023-05-25 DIAGNOSIS — H40.9 GLAUCOMA OF BOTH EYES, UNSPECIFIED GLAUCOMA TYPE: ICD-10-CM

## 2023-05-25 LAB
ALBUMIN SERPL-MCNC: 4 G/DL (ref 3.4–5)
ALBUMIN/GLOB SERPL: 1.1 {RATIO} (ref 1–2)
ALP LIVER SERPL-CCNC: 86 U/L
ALT SERPL-CCNC: 35 U/L
ANION GAP SERPL CALC-SCNC: 7 MMOL/L (ref 0–18)
AST SERPL-CCNC: 22 U/L (ref 15–37)
BASOPHILS # BLD AUTO: 0.04 X10(3) UL (ref 0–0.2)
BASOPHILS NFR BLD AUTO: 0.5 %
BILIRUB SERPL-MCNC: 1.1 MG/DL (ref 0.1–2)
BUN BLD-MCNC: 14 MG/DL (ref 7–18)
BUN/CREAT SERPL: 11.7 (ref 10–20)
CALCIUM BLD-MCNC: 9.4 MG/DL (ref 8.5–10.1)
CHLORIDE SERPL-SCNC: 111 MMOL/L (ref 98–112)
CHOLEST SERPL-MCNC: 173 MG/DL (ref ?–200)
CO2 SERPL-SCNC: 24 MMOL/L (ref 21–32)
CREAT BLD-MCNC: 1.2 MG/DL
DEPRECATED RDW RBC AUTO: 41.7 FL (ref 35.1–46.3)
EOSINOPHIL # BLD AUTO: 0.01 X10(3) UL (ref 0–0.7)
EOSINOPHIL NFR BLD AUTO: 0.1 %
ERYTHROCYTE [DISTWIDTH] IN BLOOD BY AUTOMATED COUNT: 12.2 % (ref 11–15)
EST. AVERAGE GLUCOSE BLD GHB EST-MCNC: 108 MG/DL (ref 68–126)
FASTING PATIENT LIPID ANSWER: NO
FASTING STATUS PATIENT QL REPORTED: NO
GFR SERPLBLD BASED ON 1.73 SQ M-ARVRAT: 70 ML/MIN/1.73M2 (ref 60–?)
GLOBULIN PLAS-MCNC: 3.7 G/DL (ref 2.8–4.4)
GLUCOSE BLD-MCNC: 104 MG/DL (ref 70–99)
HBA1C MFR BLD: 5.4 % (ref ?–5.7)
HCT VFR BLD AUTO: 47.5 %
HDLC SERPL-MCNC: 50 MG/DL (ref 40–59)
HGB BLD-MCNC: 15.7 G/DL
IMM GRANULOCYTES # BLD AUTO: 0.02 X10(3) UL (ref 0–1)
IMM GRANULOCYTES NFR BLD: 0.2 %
LDLC SERPL CALC-MCNC: 105 MG/DL (ref ?–100)
LYMPHOCYTES # BLD AUTO: 1.28 X10(3) UL (ref 1–4)
LYMPHOCYTES NFR BLD AUTO: 15.1 %
MCH RBC QN AUTO: 30.4 PG (ref 26–34)
MCHC RBC AUTO-ENTMCNC: 33.1 G/DL (ref 31–37)
MCV RBC AUTO: 91.9 FL
MONOCYTES # BLD AUTO: 0.71 X10(3) UL (ref 0.1–1)
MONOCYTES NFR BLD AUTO: 8.4 %
NEUTROPHILS # BLD AUTO: 6.43 X10 (3) UL (ref 1.5–7.7)
NEUTROPHILS # BLD AUTO: 6.43 X10(3) UL (ref 1.5–7.7)
NEUTROPHILS NFR BLD AUTO: 75.7 %
NONHDLC SERPL-MCNC: 123 MG/DL (ref ?–130)
OSMOLALITY SERPL CALC.SUM OF ELEC: 295 MOSM/KG (ref 275–295)
PLATELET # BLD AUTO: 364 10(3)UL (ref 150–450)
POTASSIUM SERPL-SCNC: 3.9 MMOL/L (ref 3.5–5.1)
PROT SERPL-MCNC: 7.7 G/DL (ref 6.4–8.2)
PSA SERPL-MCNC: 10.1 NG/ML (ref ?–4)
RBC # BLD AUTO: 5.17 X10(6)UL
SODIUM SERPL-SCNC: 142 MMOL/L (ref 136–145)
TRIGL SERPL-MCNC: 100 MG/DL (ref 30–149)
TSI SER-ACNC: 1.08 MIU/ML (ref 0.36–3.74)
VLDLC SERPL CALC-MCNC: 17 MG/DL (ref 0–30)
WBC # BLD AUTO: 8.5 X10(3) UL (ref 4–11)

## 2023-05-25 PROCEDURE — 84443 ASSAY THYROID STIM HORMONE: CPT | Performed by: NURSE PRACTITIONER

## 2023-05-25 PROCEDURE — 36415 COLL VENOUS BLD VENIPUNCTURE: CPT | Performed by: NURSE PRACTITIONER

## 2023-05-25 PROCEDURE — 80053 COMPREHEN METABOLIC PANEL: CPT | Performed by: NURSE PRACTITIONER

## 2023-05-25 PROCEDURE — 80061 LIPID PANEL: CPT | Performed by: NURSE PRACTITIONER

## 2023-05-25 PROCEDURE — 83036 HEMOGLOBIN GLYCOSYLATED A1C: CPT

## 2023-05-25 PROCEDURE — 85025 COMPLETE CBC W/AUTO DIFF WBC: CPT | Performed by: NURSE PRACTITIONER

## 2023-05-25 PROCEDURE — 84153 ASSAY OF PSA TOTAL: CPT

## 2023-05-25 RX ORDER — DORZOLAMIDE HCL 20 MG/ML
SOLUTION/ DROPS OPHTHALMIC
COMMUNITY
Start: 2023-05-09

## 2023-05-25 RX ORDER — ASPIRIN 81 MG/1
81 TABLET ORAL DAILY
Qty: 90 TABLET | Refills: 1 | Status: SHIPPED | OUTPATIENT
Start: 2023-05-25

## 2023-05-25 RX ORDER — AMLODIPINE BESYLATE 10 MG/1
10 TABLET ORAL DAILY
Qty: 90 TABLET | Refills: 1 | Status: SHIPPED | OUTPATIENT
Start: 2023-05-25

## 2023-05-25 RX ORDER — NAPROXEN 500 MG/1
500 TABLET ORAL 2 TIMES DAILY WITH MEALS
Qty: 28 TABLET | Refills: 0 | Status: SHIPPED | OUTPATIENT
Start: 2023-05-25 | End: 2023-06-08

## 2023-06-01 ENCOUNTER — TELEPHONE (OUTPATIENT)
Dept: SURGERY | Facility: CLINIC | Age: 58
End: 2023-06-01

## 2023-06-01 NOTE — TELEPHONE ENCOUNTER
----- Message from Ahsan Muñiz MD sent at 5/31/2023  4:39 PM CDT -----  Urology staff,  This patient is not on my chart. Please let him know that his PSA continues to be elevated in spite of multiple courses of antibiotics. I would recommend that he obtain an MRI of the prostate and follow-up with me afterwards to review those results in anticipation of a prostate biopsy. That is the only way for us to rule out the presence of prostate cancer since we have no other explanations for why his PSA is persistently elevated. If he has an upcoming appointment please cancel and reschedule for 3 to 4 weeks to allow him time to obtain the MRI.

## 2023-06-01 NOTE — TELEPHONE ENCOUNTER
I s/w pt's sister who has a signed AMI on file and pt was not available so I gave her the results and instructions as stated below in [facility]. I gave her the # for [facility] and she stated that she has to s[facility] her [facility] and will c/b.

## 2023-08-02 ENCOUNTER — HOSPITAL ENCOUNTER (EMERGENCY)
Facility: HOSPITAL | Age: 58
Discharge: HOME | End: 2023-08-02
Attending: EMERGENCY MEDICINE
Payer: MEDICARE

## 2023-08-02 VITALS
TEMPERATURE: 98 F | SYSTOLIC BLOOD PRESSURE: 135 MMHG | DIASTOLIC BLOOD PRESSURE: 85 MMHG | HEART RATE: 83 BPM | OXYGEN SATURATION: 98 % | RESPIRATION RATE: 18 BRPM

## 2023-08-02 DIAGNOSIS — R20.2 PARESTHESIAS: Primary | ICD-10-CM

## 2023-08-02 LAB
ALBUMIN SERPL-MCNC: 4.2 G/DL (ref 3.4–5)
ALBUMIN/GLOB SERPL: 1 {RATIO} (ref 1–2)
ALP LIVER SERPL-CCNC: 99 U/L
ALT SERPL-CCNC: 46 U/L
ANION GAP SERPL CALC-SCNC: 5 MMOL/L (ref 0–18)
AST SERPL-CCNC: 25 U/L (ref 15–37)
ATRIAL RATE: 83 BPM
BASOPHILS # BLD AUTO: 0.07 X10(3) UL (ref 0–0.2)
BASOPHILS NFR BLD AUTO: 0.8 %
BILIRUB SERPL-MCNC: 1.8 MG/DL (ref 0.1–2)
BILIRUB UR QL: NEGATIVE
BUN BLD-MCNC: 9 MG/DL (ref 7–18)
BUN/CREAT SERPL: 7.1 (ref 10–20)
CALCIUM BLD-MCNC: 9.3 MG/DL (ref 8.5–10.1)
CHLORIDE SERPL-SCNC: 106 MMOL/L (ref 98–112)
CLARITY UR: CLEAR
CO2 SERPL-SCNC: 27 MMOL/L (ref 21–32)
COLOR UR: COLORLESS
CREAT BLD-MCNC: 1.26 MG/DL
DEPRECATED RDW RBC AUTO: 41.1 FL (ref 35.1–46.3)
EGFRCR SERPLBLD CKD-EPI 2021: 66 ML/MIN/1.73M2 (ref 60–?)
EOSINOPHIL # BLD AUTO: 0.04 X10(3) UL (ref 0–0.7)
EOSINOPHIL NFR BLD AUTO: 0.4 %
ERYTHROCYTE [DISTWIDTH] IN BLOOD BY AUTOMATED COUNT: 11.9 % (ref 11–15)
GLOBULIN PLAS-MCNC: 4.3 G/DL (ref 2.8–4.4)
GLUCOSE BLD-MCNC: 112 MG/DL (ref 70–99)
GLUCOSE UR-MCNC: NORMAL MG/DL
HCT VFR BLD AUTO: 51.1 %
HGB BLD-MCNC: 17.4 G/DL
HGB UR QL STRIP.AUTO: NEGATIVE
IMM GRANULOCYTES # BLD AUTO: 0.05 X10(3) UL (ref 0–1)
IMM GRANULOCYTES NFR BLD: 0.5 %
KETONES UR-MCNC: NEGATIVE MG/DL
LEUKOCYTE ESTERASE UR QL STRIP.AUTO: NEGATIVE
LYMPHOCYTES # BLD AUTO: 1.74 X10(3) UL (ref 1–4)
LYMPHOCYTES NFR BLD AUTO: 18.6 %
MCH RBC QN AUTO: 31.5 PG (ref 26–34)
MCHC RBC AUTO-ENTMCNC: 34.1 G/DL (ref 31–37)
MCV RBC AUTO: 92.4 FL
MONOCYTES # BLD AUTO: 0.81 X10(3) UL (ref 0.1–1)
MONOCYTES NFR BLD AUTO: 8.7 %
NEUTROPHILS # BLD AUTO: 6.62 X10 (3) UL (ref 1.5–7.7)
NEUTROPHILS # BLD AUTO: 6.62 X10(3) UL (ref 1.5–7.7)
NEUTROPHILS NFR BLD AUTO: 71 %
NITRITE UR QL STRIP.AUTO: NEGATIVE
OSMOLALITY SERPL CALC.SUM OF ELEC: 285 MOSM/KG (ref 275–295)
P AXIS: 53 DEGREES
P-R INTERVAL: 168 MS
PH UR: 7.5 [PH] (ref 5–8)
PLATELET # BLD AUTO: 373 10(3)UL (ref 150–450)
POTASSIUM SERPL-SCNC: 4.2 MMOL/L (ref 3.5–5.1)
PROT SERPL-MCNC: 8.5 G/DL (ref 6.4–8.2)
PROT UR-MCNC: NEGATIVE MG/DL
Q-T INTERVAL: 354 MS
QRS DURATION: 84 MS
QTC CALCULATION (BEZET): 415 MS
R AXIS: 58 DEGREES
RBC # BLD AUTO: 5.53 X10(6)UL
SODIUM SERPL-SCNC: 138 MMOL/L (ref 136–145)
SP GR UR STRIP: 1.01 (ref 1–1.03)
T AXIS: 25 DEGREES
TROPONIN I HIGH SENSITIVITY: 24 NG/L
UROBILINOGEN UR STRIP-ACNC: NORMAL
VENTRICULAR RATE: 83 BPM
WBC # BLD AUTO: 9.3 X10(3) UL (ref 4–11)

## 2023-08-02 PROCEDURE — 99284 EMERGENCY DEPT VISIT MOD MDM: CPT

## 2023-08-02 PROCEDURE — 84484 ASSAY OF TROPONIN QUANT: CPT

## 2023-08-02 PROCEDURE — 80053 COMPREHEN METABOLIC PANEL: CPT | Performed by: EMERGENCY MEDICINE

## 2023-08-02 PROCEDURE — 84484 ASSAY OF TROPONIN QUANT: CPT | Performed by: EMERGENCY MEDICINE

## 2023-08-02 PROCEDURE — 93005 ELECTROCARDIOGRAM TRACING: CPT

## 2023-08-02 PROCEDURE — 80053 COMPREHEN METABOLIC PANEL: CPT

## 2023-08-02 PROCEDURE — 99283 EMERGENCY DEPT VISIT LOW MDM: CPT

## 2023-08-02 PROCEDURE — 36415 COLL VENOUS BLD VENIPUNCTURE: CPT

## 2023-08-02 PROCEDURE — 85025 COMPLETE CBC W/AUTO DIFF WBC: CPT

## 2023-08-02 PROCEDURE — 93010 ELECTROCARDIOGRAM REPORT: CPT

## 2023-08-02 PROCEDURE — 85025 COMPLETE CBC W/AUTO DIFF WBC: CPT | Performed by: EMERGENCY MEDICINE

## 2023-08-02 NOTE — ED INITIAL ASSESSMENT (HPI)
Patient arrives via triage for a \"stinging\"sensation in stomach/chest. Denies diarrhea, nausea, or vomiting

## 2023-08-17 ENCOUNTER — HOSPITAL ENCOUNTER (OUTPATIENT)
Dept: MRI IMAGING | Facility: HOSPITAL | Age: 58
Discharge: HOME | End: 2023-08-17
Attending: UROLOGY
Payer: MEDICARE

## 2023-08-17 DIAGNOSIS — R97.20 ELEVATED PSA: ICD-10-CM

## 2023-08-17 PROCEDURE — A9575 INJ GADOTERATE MEGLUMI 0.1ML: HCPCS | Performed by: UROLOGY

## 2023-08-17 PROCEDURE — 72197 MRI PELVIS W/O & W/DYE: CPT | Performed by: UROLOGY

## 2023-08-17 RX ORDER — GADOTERATE MEGLUMINE 376.9 MG/ML
20 INJECTION INTRAVENOUS
Status: COMPLETED | OUTPATIENT
Start: 2023-08-17 | End: 2023-08-17

## 2023-08-17 RX ADMIN — GADOTERATE MEGLUMINE 20 ML: 376.9 INJECTION INTRAVENOUS at 17:00:00

## 2023-08-25 ENCOUNTER — LAB ENCOUNTER (OUTPATIENT)
Dept: LAB | Facility: HOSPITAL | Age: 58
End: 2023-08-25
Attending: UROLOGY
Payer: MEDICARE

## 2023-08-25 ENCOUNTER — OFFICE VISIT (OUTPATIENT)
Dept: SURGERY | Facility: CLINIC | Age: 58
End: 2023-08-25

## 2023-08-25 DIAGNOSIS — R97.20 ELEVATED PSA: Primary | ICD-10-CM

## 2023-08-25 DIAGNOSIS — R36.9 URETHRAL DISCHARGE: ICD-10-CM

## 2023-08-25 LAB
BILIRUB UR QL: NEGATIVE
CLARITY UR: CLEAR
COLOR UR: YELLOW
GLUCOSE UR-MCNC: NORMAL MG/DL
HGB UR QL STRIP.AUTO: NEGATIVE
KETONES UR-MCNC: NEGATIVE MG/DL
LEUKOCYTE ESTERASE UR QL STRIP.AUTO: NEGATIVE
NITRITE UR QL STRIP.AUTO: NEGATIVE
PH UR: 6.5 [PH] (ref 5–8)
PROT UR-MCNC: 50 MG/DL
SP GR UR STRIP: 1.02 (ref 1–1.03)
UROBILINOGEN UR STRIP-ACNC: NORMAL

## 2023-08-25 PROCEDURE — 87086 URINE CULTURE/COLONY COUNT: CPT

## 2023-08-25 PROCEDURE — 99213 OFFICE O/P EST LOW 20 MIN: CPT | Performed by: UROLOGY

## 2023-08-25 PROCEDURE — 81001 URINALYSIS AUTO W/SCOPE: CPT

## 2023-08-25 RX ORDER — CEFDINIR 300 MG/1
300 CAPSULE ORAL EVERY 12 HOURS
Qty: 6 CAPSULE | Refills: 0 | Status: SHIPPED | OUTPATIENT
Start: 2023-08-25 | End: 2023-08-28

## 2023-08-25 RX ORDER — CIPROFLOXACIN 500 MG/1
500 TABLET, FILM COATED ORAL 2 TIMES DAILY
Qty: 6 TABLET | Refills: 0 | Status: SHIPPED | OUTPATIENT
Start: 2023-08-25

## 2023-08-25 NOTE — PROGRESS NOTES
Italia Gaitan is a 59year old male. HPI:   Patient presents with:  elevated psa: Discuss MRI results, c/o occasional dysuria, c/o post void dribbling      49-year-old male accompanied by his caretaker and brother in follow-up to visit March 21, 2023 with a history of urethral discharge which reportedly has resolved, intermittent but rare mild dysuria. When asked how often he states once in a while but cannot be any more specific. He also has a history of elevated PSA most recently on an upward trend at 10.1 May 25, 2023. He denies any known family history of prostate cancer. He was referred for a prostate MRI performed August 17, 2023 demonstrating PI-RADS 3 classification with an indeterminant well-circumscribed prostatic lesion in the peripheral zone. There is also borderline sized external iliac chain lymph nodes noted bilaterally. IPSS score is 8, quality-of-life index is 3. Digital prostate exam previously have not demonstrated any abnormalities. HISTORY:  Past Medical History:   Diagnosis Date    Essential hypertension     Glaucoma       No past surgical history on file.    Family History   Problem Relation Age of Onset    Other (Other) Mother     Hypertension Sister     Other (Other) Sister     Other (Brain Aneurysm) Sister     Other (Other) Brother     Migraines Sister       Social History:   Social History     Socioeconomic History    Marital status: Single   Tobacco Use    Smoking status: Never    Smokeless tobacco: Never   Substance and Sexual Activity    Alcohol use: Not Currently     Alcohol/week: 2.0 - 3.0 standard drinks of alcohol     Types: 2 - 3 Cans of beer per week    Drug use: Never   Other Topics Concern    Caffeine Concern Yes     Comment: 1 cup daily    Exercise Yes     Comment: walks        Medications (Active prior to today's visit):  Current Outpatient Medications   Medication Sig Dispense Refill    cefdinir 300 MG Oral Cap Take 1 capsule (300 mg total) by mouth every 12 (twelve) hours for 3 days. Start the day before the procedure 6 capsule 0    ciprofloxacin 500 MG Oral Tab Take 1 tablet (500 mg total) by mouth 2 (two) times daily. 6 tablet 0    dorzolamide 2 % Ophthalmic Solution INSTILL 1 DROP IN BOTH EYES THREE TIMES DAILY AS DIRECTED      amLODIPine 10 MG Oral Tab Take 1 tablet (10 mg total) by mouth daily. 90 tablet 1    aspirin (ASPIRIN LOW DOSE) 81 MG Oral Tab EC Take 1 tablet (81 mg total) by mouth daily. 90 tablet 1    latanoprost 0.005 % Ophthalmic Solution INSTILL 1 DROP BOTH EYES EVERY DAY AT BEDTIME AS DIRECTED      Brimonidine Tartrate-Timolol 0.2-0.5 % Ophthalmic Solution Place 1 drop into both eyes every 12 (twelve) hours. Allergies:  No Known Allergies      ROS:       PHYSICAL EXAM:        ASSESSMENT/PLAN:   Assessment   Elevated psa  (primary encounter diagnosis)  Urethral discharge    Recommend:  - Recommended a repeat urinalysis and urine cultures to be done today. If abnormal, consider office cystoscopy. - We will schedule the patient for an MRI ultrasound fusion prostate biopsy given persistently increasing PSA. Risks and possible side effects reviewed with patient and his brother and they understand and agree. Orders This Visit:  Orders Placed This Encounter      Urinalysis, Routine      Urine Culture, Routine      Meds This Visit:  Requested Prescriptions     Signed Prescriptions Disp Refills    cefdinir 300 MG Oral Cap 6 capsule 0     Sig: Take 1 capsule (300 mg total) by mouth every 12 (twelve) hours for 3 days. Start the day before the procedure    ciprofloxacin 500 MG Oral Tab 6 tablet 0     Sig: Take 1 tablet (500 mg total) by mouth 2 (two) times daily.        Imaging & Referrals:  None     8/25/2023  Behzad Cuello MD

## 2023-08-29 ENCOUNTER — TELEPHONE (OUTPATIENT)
Dept: SURGERY | Facility: CLINIC | Age: 58
End: 2023-08-29

## 2023-09-12 ENCOUNTER — OFFICE VISIT (OUTPATIENT)
Dept: PHYSICAL MEDICINE AND REHAB | Facility: CLINIC | Age: 58
End: 2023-09-12
Payer: MEDICARE

## 2023-09-12 VITALS — OXYGEN SATURATION: 99 % | WEIGHT: 207 LBS | BODY MASS INDEX: 33 KG/M2 | HEART RATE: 78 BPM

## 2023-09-12 DIAGNOSIS — R20.2 NUMBNESS AND TINGLING: ICD-10-CM

## 2023-09-12 DIAGNOSIS — R20.0 NUMBNESS AND TINGLING: ICD-10-CM

## 2023-09-12 DIAGNOSIS — R10.9 ABDOMINAL PAIN, UNSPECIFIED ABDOMINAL LOCATION: Primary | ICD-10-CM

## 2023-09-12 PROCEDURE — 99213 OFFICE O/P EST LOW 20 MIN: CPT | Performed by: PHYSICAL MEDICINE & REHABILITATION

## 2023-09-21 ENCOUNTER — TELEPHONE (OUTPATIENT)
Dept: FAMILY MEDICINE CLINIC | Facility: CLINIC | Age: 58
End: 2023-09-21

## 2023-09-21 PROBLEM — R10.9 ABDOMINAL PAIN: Status: ACTIVE | Noted: 2023-09-21

## 2023-10-24 ENCOUNTER — OFFICE VISIT (OUTPATIENT)
Dept: NEUROLOGY | Facility: CLINIC | Age: 58
End: 2023-10-24

## 2023-10-24 VITALS — DIASTOLIC BLOOD PRESSURE: 88 MMHG | SYSTOLIC BLOOD PRESSURE: 145 MMHG

## 2023-10-24 DIAGNOSIS — M47.812 CERVICAL SPONDYLOSIS: ICD-10-CM

## 2023-10-24 DIAGNOSIS — M79.18 MYOFASCIAL PAIN: ICD-10-CM

## 2023-10-24 DIAGNOSIS — R20.2 PARESTHESIAS: Primary | ICD-10-CM

## 2023-10-24 PROCEDURE — 99213 OFFICE O/P EST LOW 20 MIN: CPT | Performed by: OTHER

## 2023-10-24 NOTE — PROGRESS NOTES
[address]  [address] [facility], [address] [place] [address]  [phone]          Established  Follow Up Visit       Date: October 24, 2023  Patient Name: Rommel Varghese   MRN: [id number]  Primary physician: Cecilia Mensah    Interval History:   --He is here complaining of about 2 months of tingling and \"nerve stinging\" below his stomach (points towards his right lower abdomen), feels tight. Went to the ER 8/8/2023 for tingling throughout his extremities and abdomen, scalp. OUTPATIENT MEDICATIONS  dorzolamide 2 % Ophthalmic Solution, INSTILL 1 DROP IN BOTH EYES THREE TIMES DAILY AS DIRECTED, Disp: , Rfl:   amLODIPine 10 MG Oral Tab, Take 1 tablet (10 mg total) by mouth daily. , Disp: 90 tablet, Rfl: 1  aspirin (ASPIRIN LOW DOSE) 81 MG Oral Tab EC, Take 1 tablet (81 mg total) by mouth daily. , Disp: 90 tablet, Rfl: 1  latanoprost 0.005 % Ophthalmic Solution, INSTILL 1 DROP BOTH EYES EVERY DAY AT BEDTIME AS DIRECTED, Disp: , Rfl:   Brimonidine Tartrate-Timolol 0.2-0.5 % Ophthalmic Solution, Place 1 drop into both eyes every 12 (twelve) hours. , Disp: , Rfl:   ciprofloxacin 500 MG Oral Tab, Take 1 tablet (500 mg total) by mouth 2 (two) times daily. (Patient not taking: Reported on 10/24/2023), Disp: 6 tablet, Rfl: 0    No current facility-administered medications on file prior to visit. PHYSICAL EXAM:   /88 (BP Location: Right arm, Patient Position: Sitting)   General appearance: Well appearing, and in no acute distress  Skin: skin color, texture normal.  No rashes or lesions. Head: Normocephalic, atraumatic.     Neurological exam:    Mental Status:   Attention/Concentration: intact attention on bedside test   Fund of knowledge: intact  Speech: no dysarthria or aphasia     LABS/DATA:  Component      Latest Ref Rng 8/2/2023   WBC      4.0 - 11.0 x10(3) uL 9.3    RBC      4.30 - 5.70 x10(6)uL 5.53    Hemoglobin      13.0 - 17.5 g/dL 17.4 Hematocrit      39.0 - 53.0 % 51.1    MCV      80.0 - 100.0 fL 92.4    MCH      26.0 - 34.0 pg 31.5    MCHC      31.0 - 37.0 g/dL 34.1    RDW-SD      35.1 - 46.3 fL 41.1    RDW      11.0 - 15.0 % 11.9    Platelet Count      422.9 - 450.0 10(3)uL 373.0    Prelim Neutrophil Abs      1.50 - 7.70 x10 (3) uL 6.62    Neutrophils Absolute      1.50 - 7.70 x10(3) uL 6.62    Lymphocytes Absolute      1.00 - 4.00 x10(3) uL 1.74    Monocytes Absolute      0.10 - 1.00 x10(3) uL 0.81    Eosinophils Absolute      0.00 - 0.70 x10(3) uL 0.04    Basophils Absolute      0.00 - 0.20 x10(3) uL 0.07    Immature Granulocyte Absolute      0.00 - 1.00 x10(3) uL 0.05    Neutrophils %      % 71.0    Lymphocytes %      % 18.6    Monocytes %      % 8.7    Eosinophils %      % 0.4    Basophils %      % 0.8    Immature Granulocyte %      % 0.5    Glucose      70 - 99 mg/dL 112 (H)    Sodium      136 - 145 mmol/L 138    Potassium      3.5 - 5.1 mmol/L 4.2    Chloride      98 - 112 mmol/L 106    Carbon Dioxide, Total      21.0 - 32.0 mmol/L 27.0    ANION GAP      0 - 18 mmol/L 5    BUN      7 - 18 mg/dL 9    CREATININE      0.70 - 1.30 mg/dL 1.26    BUN/CREATININE RATIO      10.0 - 20.0  7.1 (L)    CALCIUM      8.5 - 10.1 mg/dL 9.3    CALCULATED OSMOLALITY      275 - 295 mOsm/kg 285    EGFR      >=60 mL/min/1.73m2 66    ALT (SGPT)      16 - 61 U/L 46    AST (SGOT)      15 - 37 U/L 25    ALKALINE PHOSPHATASE      45 - 117 U/L 99    Total Bilirubin      0.1 - 2.0 mg/dL 1.8    PROTEIN, TOTAL      6.4 - 8.2 g/dL 8.5 (H)    Albumin      3.4 - 5.0 g/dL 4.2    Globulin      2.8 - 4.4 g/dL 4.3    A/G Ratio      1.0 - 2.0  1.0       Legend:  (H) High  (L) Low      IMAGING:  N/A    ASSESSMENT:  The patient is a 59year old man with past medical history of left central retinal artery occlusion (discovered incidentally), essential hypertension, glaucoma, developmental delay with childhood epilepsy who presents for follow up.       His neurological examination was significant for slightly reduced fund of knowledge but otherwise nonfocal, consistent with his history of developmental delay and childhood epilepsy. -MRI brain 7/23/2021: Motion degraded; no acute intracranial process  -Carotid ultrasound 8/25/2021: No significant stenosis  -CT C spine 9/10/2021: with multilevel degenerative disc disease and right C4 nerve impingement. Mild canal narrowing C3 C6  -CTA brain 9/10/2021 with no aneurysm; incidental diminutive right vertebral that terminates in PICA and ACOM   -Hemoglobin A1c 5.4  -  -MRI cervical spine 9/28/2022 with multilevel degenerative change and ventral cord indentation at multiple levels, had been referred to neurosurgery and spine medicine    Paresthesias evaluate for thoracic and lumbar spondylosis leading to radiculopathy/myelopathy and abdominal paresthesias  Myofascial pain, cervical spondylosis with cord abutment and flattening we discussed his cervical spondylosis is leading to his cervicogenic headaches and scalp dysesthesias  -MRI thoracic and lumbar spine     Follow up: 1 month       Discussed indication, administration, dose, and side effects with patient of any medications personally prescribed. Patient was advised to let my office know if they have any questions or concerns. Today, I personally spent 15 minutes in this case, including chart review, time spent with patient doing face to face evaluation w/ interview and exam and patient education, counseling, and time was spent in patient education, counseling, and coordination of care as described above. Issues discussed: Diagnosis and implications on future health, benefits and side effects of present and future medications, test results as well as further testing and medications required. This note was prepared using Side.Cr voice recognition dictation software and as a result, errors may occur. When identified, these errors have been corrected.  While every attempt is made to correct errors during dictation, discrepancies may still exist    Margarita Sanchez DO   Staff Physician, Neurology   10/24/2023  7:54 AM

## 2023-11-27 ENCOUNTER — OFFICE VISIT (OUTPATIENT)
Dept: FAMILY MEDICINE CLINIC | Facility: CLINIC | Age: 58
End: 2023-11-27
Payer: MEDICARE

## 2023-11-27 VITALS
WEIGHT: 207.63 LBS | DIASTOLIC BLOOD PRESSURE: 66 MMHG | HEART RATE: 78 BPM | BODY MASS INDEX: 32.59 KG/M2 | HEIGHT: 67 IN | SYSTOLIC BLOOD PRESSURE: 112 MMHG

## 2023-11-27 DIAGNOSIS — H34.9 RETINAL ARTERY OCCLUSION: ICD-10-CM

## 2023-11-27 DIAGNOSIS — R19.02 LEFT UPPER QUADRANT ABDOMINAL MASS: ICD-10-CM

## 2023-11-27 DIAGNOSIS — R10.12 LEFT UPPER QUADRANT ABDOMINAL PAIN: Primary | ICD-10-CM

## 2023-11-27 DIAGNOSIS — I10 ESSENTIAL HYPERTENSION: ICD-10-CM

## 2023-11-27 DIAGNOSIS — Z12.11 SCREENING FOR MALIGNANT NEOPLASM OF COLON: ICD-10-CM

## 2023-11-27 PROCEDURE — 99214 OFFICE O/P EST MOD 30 MIN: CPT | Performed by: NURSE PRACTITIONER

## 2023-11-27 RX ORDER — ASPIRIN 81 MG/1
81 TABLET ORAL DAILY
Qty: 90 TABLET | Refills: 1 | Status: SHIPPED | OUTPATIENT
Start: 2023-11-27

## 2023-11-27 RX ORDER — AMLODIPINE BESYLATE 10 MG/1
10 TABLET ORAL DAILY
Qty: 90 TABLET | Refills: 1 | Status: SHIPPED | OUTPATIENT
Start: 2023-11-27

## 2023-11-30 NOTE — TELEPHONE ENCOUNTER
ORDER PLACED
Please place Cologuard order for this patient. Thanks!
Detail Level: Detailed
Quality 431: Preventive Care And Screening: Unhealthy Alcohol Use - Screening: Patient not identified as an unhealthy alcohol user when screened for unhealthy alcohol use using a systematic screening method
Quality 226: Preventive Care And Screening: Tobacco Use: Screening And Cessation Intervention: Patient screened for tobacco use and is an ex/non-smoker

## 2023-12-13 ENCOUNTER — HOSPITAL ENCOUNTER (OUTPATIENT)
Dept: CT IMAGING | Age: 58
Discharge: HOME | End: 2023-12-13
Attending: NURSE PRACTITIONER
Payer: MEDICARE

## 2023-12-13 DIAGNOSIS — R19.02 LEFT UPPER QUADRANT ABDOMINAL MASS: ICD-10-CM

## 2023-12-13 DIAGNOSIS — R10.12 LEFT UPPER QUADRANT ABDOMINAL PAIN: ICD-10-CM

## 2023-12-13 PROCEDURE — 74160 CT ABDOMEN W/CONTRAST: CPT | Performed by: NURSE PRACTITIONER

## 2023-12-14 ENCOUNTER — TELEPHONE (OUTPATIENT)
Dept: FAMILY MEDICINE CLINIC | Facility: CLINIC | Age: 58
End: 2023-12-14

## 2023-12-14 DIAGNOSIS — R93.2 ABNORMAL CT OF LIVER: Primary | ICD-10-CM

## 2023-12-14 DIAGNOSIS — R93.5 ABNORMAL CT OF THE ABDOMEN: ICD-10-CM

## 2023-12-14 NOTE — TELEPHONE ENCOUNTER
I spoke with pt's sister Valencia Mai, reviewed and discussed results per Payton Santiago note (under CT results). Valencia Pau is agreeable to call central scheduling to make appt for MRI. Advised to give us a heads up once test is complete for next steps. She is agreeable with plan.

## 2023-12-15 ENCOUNTER — HOSPITAL ENCOUNTER (EMERGENCY)
Facility: HOSPITAL | Age: 58
Discharge: HOME | End: 2023-12-15
Attending: EMERGENCY MEDICINE
Payer: MEDICARE

## 2023-12-15 VITALS
DIASTOLIC BLOOD PRESSURE: 103 MMHG | OXYGEN SATURATION: 98 % | SYSTOLIC BLOOD PRESSURE: 149 MMHG | RESPIRATION RATE: 16 BRPM | WEIGHT: 204 LBS | HEART RATE: 87 BPM | TEMPERATURE: 98 F | BODY MASS INDEX: 32.02 KG/M2 | HEIGHT: 67 IN

## 2023-12-15 DIAGNOSIS — S39.011A STRAIN OF ABDOMINAL WALL, INITIAL ENCOUNTER: Primary | ICD-10-CM

## 2023-12-15 DIAGNOSIS — E66.9 OBESITY, UNSPECIFIED CLASSIFICATION, UNSPECIFIED OBESITY TYPE, UNSPECIFIED WHETHER SERIOUS COMORBIDITY PRESENT: ICD-10-CM

## 2023-12-15 LAB
ALBUMIN SERPL-MCNC: 4.5 G/DL (ref 3.2–4.8)
ALBUMIN/GLOB SERPL: 1.6 {RATIO} (ref 1–2)
ALP LIVER SERPL-CCNC: 84 U/L
ALT SERPL-CCNC: 18 U/L
ANION GAP SERPL CALC-SCNC: 6 MMOL/L (ref 0–18)
AST SERPL-CCNC: 21 U/L (ref ?–34)
BASOPHILS # BLD AUTO: 0.04 X10(3) UL (ref 0–0.2)
BASOPHILS NFR BLD AUTO: 0.5 %
BILIRUB SERPL-MCNC: 2 MG/DL (ref 0.2–1.1)
BUN BLD-MCNC: 11 MG/DL (ref 9–23)
BUN/CREAT SERPL: 10.6 (ref 10–20)
CALCIUM BLD-MCNC: 9.6 MG/DL (ref 8.7–10.4)
CHLORIDE SERPL-SCNC: 107 MMOL/L (ref 98–112)
CO2 SERPL-SCNC: 26 MMOL/L (ref 21–32)
CREAT BLD-MCNC: 1.04 MG/DL
DEPRECATED RDW RBC AUTO: 38.3 FL (ref 35.1–46.3)
EGFRCR SERPLBLD CKD-EPI 2021: 82 ML/MIN/1.73M2 (ref 60–?)
EOSINOPHIL # BLD AUTO: 0.04 X10(3) UL (ref 0–0.7)
EOSINOPHIL NFR BLD AUTO: 0.5 %
ERYTHROCYTE [DISTWIDTH] IN BLOOD BY AUTOMATED COUNT: 11.8 % (ref 11–15)
GLOBULIN PLAS-MCNC: 2.9 G/DL (ref 2.8–4.4)
GLUCOSE BLD-MCNC: 108 MG/DL (ref 70–99)
HCT VFR BLD AUTO: 48 %
HGB BLD-MCNC: 16.5 G/DL
IMM GRANULOCYTES # BLD AUTO: 0.03 X10(3) UL (ref 0–1)
IMM GRANULOCYTES NFR BLD: 0.4 %
LYMPHOCYTES # BLD AUTO: 1.33 X10(3) UL (ref 1–4)
LYMPHOCYTES NFR BLD AUTO: 16 %
MCH RBC QN AUTO: 30.8 PG (ref 26–34)
MCHC RBC AUTO-ENTMCNC: 34.4 G/DL (ref 31–37)
MCV RBC AUTO: 89.6 FL
MONOCYTES # BLD AUTO: 0.72 X10(3) UL (ref 0.1–1)
MONOCYTES NFR BLD AUTO: 8.7 %
NEUTROPHILS # BLD AUTO: 6.14 X10 (3) UL (ref 1.5–7.7)
NEUTROPHILS # BLD AUTO: 6.14 X10(3) UL (ref 1.5–7.7)
NEUTROPHILS NFR BLD AUTO: 73.9 %
OSMOLALITY SERPL CALC.SUM OF ELEC: 288 MOSM/KG (ref 275–295)
PLATELET # BLD AUTO: 333 10(3)UL (ref 150–450)
POTASSIUM SERPL-SCNC: 4 MMOL/L (ref 3.5–5.1)
PROT SERPL-MCNC: 7.4 G/DL (ref 5.7–8.2)
RBC # BLD AUTO: 5.36 X10(6)UL
SODIUM SERPL-SCNC: 139 MMOL/L (ref 136–145)
WBC # BLD AUTO: 8.3 X10(3) UL (ref 4–11)

## 2023-12-15 PROCEDURE — 80053 COMPREHEN METABOLIC PANEL: CPT | Performed by: EMERGENCY MEDICINE

## 2023-12-15 PROCEDURE — 99283 EMERGENCY DEPT VISIT LOW MDM: CPT

## 2023-12-15 PROCEDURE — 80053 COMPREHEN METABOLIC PANEL: CPT

## 2023-12-15 PROCEDURE — 85025 COMPLETE CBC W/AUTO DIFF WBC: CPT

## 2023-12-15 PROCEDURE — 85025 COMPLETE CBC W/AUTO DIFF WBC: CPT | Performed by: EMERGENCY MEDICINE

## 2023-12-15 PROCEDURE — 99284 EMERGENCY DEPT VISIT MOD MDM: CPT

## 2023-12-15 PROCEDURE — 36415 COLL VENOUS BLD VENIPUNCTURE: CPT

## 2023-12-15 NOTE — ED INITIAL ASSESSMENT (HPI)
C/o abd \"pulling\" & \"tightness\" over the past couple of weeks. Denies any N/V/D or constipation. Denies hx of hernias. Denies issues tolerating PO intake.

## 2023-12-27 ENCOUNTER — TELEPHONE (OUTPATIENT)
Dept: FAMILY MEDICINE CLINIC | Facility: CLINIC | Age: 58
End: 2023-12-27

## 2024-01-17 ENCOUNTER — HOSPITAL ENCOUNTER (OUTPATIENT)
Dept: MRI IMAGING | Age: 59
Discharge: HOME | End: 2024-01-17
Attending: NURSE PRACTITIONER
Payer: MEDICARE

## 2024-01-17 DIAGNOSIS — R93.5 ABNORMAL CT OF THE ABDOMEN: ICD-10-CM

## 2024-01-17 DIAGNOSIS — R93.2 ABNORMAL CT OF LIVER: ICD-10-CM

## 2024-01-17 PROCEDURE — A9575 INJ GADOTERATE MEGLUMI 0.1ML: HCPCS | Performed by: NURSE PRACTITIONER

## 2024-01-17 PROCEDURE — 74183 MRI ABD W/O CNTR FLWD CNTR: CPT | Performed by: NURSE PRACTITIONER

## 2024-01-17 RX ORDER — GADOTERATE MEGLUMINE 376.9 MG/ML
20 INJECTION INTRAVENOUS
Status: COMPLETED | OUTPATIENT
Start: 2024-01-17 | End: 2024-01-17

## 2024-01-17 RX ADMIN — GADOTERATE MEGLUMINE 20 ML: 376.9 INJECTION INTRAVENOUS at 14:49:00

## 2024-01-18 DIAGNOSIS — R93.2 ABNORMAL MRI, LIVER: Primary | ICD-10-CM

## 2024-01-31 ENCOUNTER — TELEPHONE (OUTPATIENT)
Dept: FAMILY MEDICINE CLINIC | Facility: CLINIC | Age: 59
End: 2024-01-31

## 2024-01-31 DIAGNOSIS — H40.9 GLAUCOMA OF BOTH EYES, UNSPECIFIED GLAUCOMA TYPE: Primary | ICD-10-CM

## 2024-01-31 NOTE — TELEPHONE ENCOUNTER
Patients sister Denisse is calling on his behalf to ask for a referral for Dr. [facility]lu[facility].     F: [phone] needs before his appointment on Monday.     Please call to confirm if we are able to issue referral or if he needs to be seen first.

## 2024-02-26 ENCOUNTER — TELEPHONE (OUTPATIENT)
Dept: FAMILY MEDICINE CLINIC | Facility: CLINIC | Age: 59
End: 2024-02-26

## 2024-04-16 ENCOUNTER — HOSPITAL ENCOUNTER (EMERGENCY)
Facility: HOSPITAL | Age: 59
Discharge: HOME | End: 2024-04-16
Attending: EMERGENCY MEDICINE
Payer: MEDICARE

## 2024-04-16 ENCOUNTER — APPOINTMENT (OUTPATIENT)
Dept: CT IMAGING | Facility: HOSPITAL | Age: 59
End: 2024-04-16
Attending: EMERGENCY MEDICINE
Payer: MEDICARE

## 2024-04-16 VITALS
DIASTOLIC BLOOD PRESSURE: 96 MMHG | TEMPERATURE: 98 F | OXYGEN SATURATION: 94 % | WEIGHT: 200 LBS | BODY MASS INDEX: 32.14 KG/M2 | HEART RATE: 78 BPM | RESPIRATION RATE: 16 BRPM | SYSTOLIC BLOOD PRESSURE: 139 MMHG | HEIGHT: 66 IN

## 2024-04-16 DIAGNOSIS — R20.2 PARESTHESIAS: Primary | ICD-10-CM

## 2024-04-16 LAB
ANION GAP SERPL CALC-SCNC: 7 MMOL/L (ref 0–18)
ATRIAL RATE: 74 BPM
BASOPHILS # BLD AUTO: 0.05 X10(3) UL (ref 0–0.2)
BASOPHILS NFR BLD AUTO: 0.7 %
BUN BLD-MCNC: 11 MG/DL (ref 9–23)
BUN/CREAT SERPL: 9.5 (ref 10–20)
CALCIUM BLD-MCNC: 9.3 MG/DL (ref 8.7–10.4)
CHLORIDE SERPL-SCNC: 109 MMOL/L (ref 98–112)
CO2 SERPL-SCNC: 26 MMOL/L (ref 21–32)
CREAT BLD-MCNC: 1.16 MG/DL
DEPRECATED RDW RBC AUTO: 39.8 FL (ref 35.1–46.3)
EGFRCR SERPLBLD CKD-EPI 2021: 72 ML/MIN/1.73M2 (ref 60–?)
EOSINOPHIL # BLD AUTO: 0.06 X10(3) UL (ref 0–0.7)
EOSINOPHIL NFR BLD AUTO: 0.8 %
ERYTHROCYTE [DISTWIDTH] IN BLOOD BY AUTOMATED COUNT: 12 % (ref 11–15)
GLUCOSE BLD-MCNC: 107 MG/DL (ref 70–99)
HCT VFR BLD AUTO: 48.3 %
HGB BLD-MCNC: 16.9 G/DL
IMM GRANULOCYTES # BLD AUTO: 0.03 X10(3) UL (ref 0–1)
IMM GRANULOCYTES NFR BLD: 0.4 %
LYMPHOCYTES # BLD AUTO: 1.3 X10(3) UL (ref 1–4)
LYMPHOCYTES NFR BLD AUTO: 17.8 %
MCH RBC QN AUTO: 31.6 PG (ref 26–34)
MCHC RBC AUTO-ENTMCNC: 35 G/DL (ref 31–37)
MCV RBC AUTO: 90.3 FL
MONOCYTES # BLD AUTO: 0.74 X10(3) UL (ref 0.1–1)
MONOCYTES NFR BLD AUTO: 10.2 %
NEUTROPHILS # BLD AUTO: 5.11 X10 (3) UL (ref 1.5–7.7)
NEUTROPHILS # BLD AUTO: 5.11 X10(3) UL (ref 1.5–7.7)
NEUTROPHILS NFR BLD AUTO: 70.1 %
OSMOLALITY SERPL CALC.SUM OF ELEC: 294 MOSM/KG (ref 275–295)
P AXIS: 55 DEGREES
P-R INTERVAL: 172 MS
PLATELET # BLD AUTO: 330 10(3)UL (ref 150–450)
POTASSIUM SERPL-SCNC: 4.2 MMOL/L (ref 3.5–5.1)
Q-T INTERVAL: 372 MS
QRS DURATION: 84 MS
QTC CALCULATION (BEZET): 412 MS
R AXIS: 59 DEGREES
RBC # BLD AUTO: 5.35 X10(6)UL
SODIUM SERPL-SCNC: 142 MMOL/L (ref 136–145)
T AXIS: 29 DEGREES
TROPONIN I SERPL HS-MCNC: 16 NG/L
VENTRICULAR RATE: 74 BPM
WBC # BLD AUTO: 7.3 X10(3) UL (ref 4–11)

## 2024-04-16 PROCEDURE — 85025 COMPLETE CBC W/AUTO DIFF WBC: CPT

## 2024-04-16 PROCEDURE — 93005 ELECTROCARDIOGRAM TRACING: CPT

## 2024-04-16 PROCEDURE — 84484 ASSAY OF TROPONIN QUANT: CPT | Performed by: EMERGENCY MEDICINE

## 2024-04-16 PROCEDURE — 80048 BASIC METABOLIC PNL TOTAL CA: CPT

## 2024-04-16 PROCEDURE — 70450 CT HEAD/BRAIN W/O DYE: CPT | Performed by: EMERGENCY MEDICINE

## 2024-04-16 PROCEDURE — 36415 COLL VENOUS BLD VENIPUNCTURE: CPT

## 2024-04-16 PROCEDURE — 93010 ELECTROCARDIOGRAM REPORT: CPT

## 2024-04-16 PROCEDURE — 99285 EMERGENCY DEPT VISIT HI MDM: CPT

## 2024-04-16 PROCEDURE — 99284 EMERGENCY DEPT VISIT MOD MDM: CPT

## 2024-04-16 NOTE — ED PROVIDER NOTES
Patient Seen in: [facility] Emergency Department    History     Chief Complaint   Patient presents with    Numbness       HPI    58-year-old male here with sensation of paresthesias at the midline of his head for at least 1 year now at times also feels this at the right fingers and left fingers, occurring for several seconds at a time.  Also reports a tingling sensation in his chest for 1 year now as well for a few seconds at a time, nonexertional.  No dyspnea.  No lower extremity edema    Denies any recent immobilization, surgery, unilateral lower extremity edema, known cancer history, or recent travel. No hx of DVT or PE.     Denies any focal weakness or numbness.  No gait changes or vision changes or diplopia.    History reviewed.   Past Medical History:    Essential hypertension    Glaucoma       History reviewed. History reviewed. No pertinent surgical history.      Medications :  (Not in a hospital admission)       Family History   Problem Relation Age of Onset    Other (Other) Mother     Hypertension Sister     Other (Other) Sister     Other (Brain Aneurysm) Sister     Other (Other) Brother     Migraines Sister        Smoking Status:   Social History     Socioeconomic History    Marital status: Single   Tobacco Use    Smoking status: Never    Smokeless tobacco: Never   Vaping Use    Vaping status: Never Used   Substance and Sexual Activity    Alcohol use: Not Currently     Alcohol/week: 2.0 - 3.0 standard drinks of alcohol     Types: 2 - 3 Cans of beer per week    Drug use: Never   Other Topics Concern    Caffeine Concern Yes     Comment: 1 cup daily    Exercise Yes     Comment: walks       Constitutional and vital signs reviewed.      Social History and Family History elements reviewed from today, pertinent positives to the presenting problem noted.    Physical Exam     ED Triage Vitals [04/16/24 1134]   /86   Pulse 82   Resp 16   Temp 98.3 °F (36.8 °C)   Temp src Oral   SpO2 99 %   O2 Device  None (Room air)       All measures to prevent infection transmission during my interaction with the patient were taken. The patient was already wearing a droplet mask on my arrival to the room. Personal protective equipment was worn throughout the duration of the exam.  Handwashing was performed prior to and after the exam.  Stethoscope and any equipment used during my examination was cleaned with super sani-cloth germicidal wipes following the exam.     Physical Exam    General: NAD  Head: Normocephalic and atraumatic.  Mouth/Throat/Ears/Nose: Oropharynx is clear and moist.   Eyes: Conjunctivae and EOM are normal.   Neck: Normal range of motion. Supple.   Cardiovascular: Normal rate, regular rhythm, normal heart sounds.  Respiratory/Chest: Clear and equal bilaterally. Exhibits no tenderness.  Gastrointestinal: Soft, non-tender, non-distended. Bowel sounds are normal.   Musculoskeletal:No swelling or deformity.   Neurological: Alert and appropriate. No focal deficits. AOx4  CN II-XII grossly intact  5/5 strength: Left  strength, deltoid abduction, achilles, patella  5/5 strength: Right  strength, deltoid abduction, achilles, patella   SILT to bilateral upper and lower extremities   normal gait . Normal FTN, HTS, no dysdiadochokinesis.     Skin: Skin is warm and dry. No pallor.  Psychiatric: Has a normal mood and affect.      ED Course        Labs Reviewed   BASIC METABOLIC PANEL (8) - Abnormal; Notable for the following components:       Result Value    Glucose 107 (*)     BUN/CREA Ratio 9.5 (*)     All other components within normal limits   TROPONIN I HIGH SENSITIVITY - Normal   CBC WITH DIFFERENTIAL WITH PLATELET    Narrative:     The following orders were created for panel order CBC With Differential With Platelet.                  Procedure                               Abnormality         Status                                     ---------                               -----------         ------                                      CBC W/ DIFFERENTIAL[[id number]]                              Final result                                                 Please view results for these tests on the individual orders.   RAINBOW DRAW LAVENDER   RAINBOW DRAW LIGHT GREEN   RAINBOW DRAW BLUE   RAINBOW DRAW GOLD   CBC W/ DIFFERENTIAL     EKG    Rate, intervals and axes as noted on EKG Report.  Rate: 74  Rhythm: Sinus Rhythm  Reading: No STEMI.  This is my interpretation.           As Interpreted by me    Imaging Results Available and Reviewed while in ED: No results found.  ED Medications Administered: Medications - No data to display      MDM     Vitals:    04/16/24 1134 04/16/24 1245   BP: 132/86 (!) 139/96   Pulse: 82 78   Resp: 16    Temp: 98.3 °F (36.8 °C)    TempSrc: Oral    SpO2: 99% 94%   Weight: 90.7 kg    Height: 167.6 cm (5' 6\")      *I personally reviewed and interpreted all ED vitals.    Pulse Ox: 94%, Room air, Normal     Monitor Interpretation:   normal sinus rhythm as interpreted by me.  The cardiac monitor was ordered given concern for electrolyte derangements.      Medical Decision Making      Differential Diagnosis/ Diagnostic Considerations: Nonspecific paresthesias, electrolyte derangements, ACS     Complicating Factors: The patient already has mild developmental delay  to contribute to the complexity of this ED evaluation.    I reviewed prior chart records including ED visit note from December 15, 2023.  Patient here with nonspecific paresthesias, no clinical evidence of acute CVA.  CT head without intracranial hemorrhage on my interpretation.  His labs are reviewed and unremarkable for acute findings, chest sensation is inconsistent with ACS.  Patient has follow-up with Dr. Linder next week.    Discharged in stable condition.  Patient is comfortable with the plan.      Disposition and Plan     Clinical Impression:  1. Paresthesias        Disposition:  Discharge    Follow-up:  Nikos Vanegas MD  [facility]  [place] [address] [address]  [phone]    Schedule an appointment as soon as possible for a visit in 1 day(s)        Medications Prescribed:  Discharge Medication List as of 4/16/2024  1:36 PM

## 2024-04-16 NOTE — ED QUICK NOTES
All discharge instructions including discharge meds and follow up reviewed with patient. Verbalized understanding. IV removed with catheter intact. Patient ambulated out of ED in no apparent distress.

## 2024-04-16 NOTE — ED INITIAL ASSESSMENT (HPI)
C/o numbness and tingling to head x1 month. More numbness on right than left, but feels on the left too. Patient reports numbness and tingling has been pretty consistent over the last month.

## 2024-04-23 ENCOUNTER — OFFICE VISIT (OUTPATIENT)
Dept: NEUROLOGY | Facility: CLINIC | Age: 59
End: 2024-04-23
Payer: MEDICARE

## 2024-04-23 VITALS
WEIGHT: 200 LBS | HEART RATE: 86 BPM | BODY MASS INDEX: 32.14 KG/M2 | DIASTOLIC BLOOD PRESSURE: 86 MMHG | HEIGHT: 66 IN | SYSTOLIC BLOOD PRESSURE: 134 MMHG

## 2024-04-23 DIAGNOSIS — R20.2 PARESTHESIAS: Primary | ICD-10-CM

## 2024-04-23 DIAGNOSIS — M47.812 CERVICAL SPONDYLOSIS: ICD-10-CM

## 2024-04-23 PROCEDURE — 99214 OFFICE O/P EST MOD 30 MIN: CPT | Performed by: OTHER

## 2024-04-23 RX ORDER — DORZOLAMIDE HYDROCHLORIDE AND TIMOLOL MALEATE 20; 5 MG/ML; MG/ML
1 SOLUTION/ DROPS OPHTHALMIC 2 TIMES DAILY
COMMUNITY
Start: 2024-04-15

## 2024-04-23 NOTE — PROGRESS NOTES
[facility] [place] [place]  [address], [address]  [facility] [address]  [phone]          Established  Follow Up Visit       Date: April 23, 2024  Patient Name: Shawn Cortez   MRN: [id number]  Primary physician: [address]  [place] [address]    Interval History:   --Here for paresthesias in his neck and down his back on the right > left.  Went to ER 4/16/2024 for these chronic symptoms.  CT brain NAP.  He is complaining of scalp numbness/tingling and eye issues, persisting.  He had MRIs of his thoracic and lumbar spine that have not yet been done.        OUTPATIENT MEDICATIONS  Current Outpatient Medications on File Prior to Visit   Medication Sig Dispense Refill    dorzolamide-timolol 2-0.5 % Ophthalmic Solution Place 1 drop into both eyes 2 (two) times daily.      aspirin (ASPIRIN LOW DOSE) 81 MG Oral Tab EC Take 1 tablet (81 mg total) by mouth daily. 90 tablet 1    amLODIPine 10 MG Oral Tab Take 1 tablet (10 mg total) by mouth daily. 90 tablet 1    dorzolamide 2 % Ophthalmic Solution INSTILL 1 DROP IN BOTH EYES THREE TIMES DAILY AS DIRECTED      latanoprost 0.005 % Ophthalmic Solution INSTILL 1 DROP BOTH EYES EVERY DAY AT BEDTIME AS DIRECTED      Brimonidine Tartrate-Timolol 0.2-0.5 % Ophthalmic Solution Place 1 drop into both eyes every 12 (twelve) hours.       No current facility-administered medications on file prior to visit.         PHYSICAL EXAM:     /86   Pulse 86   Ht 66\"   Wt 200 lb (90.7 kg)   BMI 32.28 kg/m²   General appearance: Well appearing, and in no acute distress  Skin: skin color, texture normal.  No rashes or lesions.    Head: Normocephalic, atraumatic.    Neurological:  Mental Status: Alert, reduced fund of knowledge, abnormal thought process: perseverates about symptoms, Follows commands, and Speech fluent   Cranial Nerves:visual fields intact to confrontation, extraocular movements intact, facial sensation intact, face symmetric, no facial droop  or ptosis, normal bedside auditory acuity, no dysarthria  Motor: muscle strength 5/5 both upper and lower extremities  Reflexes: 3+ biceps, 2+ patellas   Sensation: intact to light touch, vibration  Coordination: Finger-to- nose-finger intact bilaterally   Gait: mildly ataxic gait       LABS/DATA:  Reviewed CBC, BMP and troponin       IMAGING:  CT brain  I PERSONALLY REVIEWED THESE IMAGES.     ASSESSMENT:  The patient is a 58 year old man with past medical history of left central retinal artery occlusion (discovered incidentally), essential hypertension, glaucoma, developmental delay with childhood epilepsy who presents for follow up.       -MRI brain 7/23/2021: Motion degraded; no acute intracranial process  -Carotid ultrasound 8/25/2021: No significant stenosis  -CT C spine 9/10/2021: with multilevel degenerative disc disease and right C4 nerve impingement.  Mild canal narrowing C3 C6  -CTA brain 9/10/2021 with no aneurysm; incidental diminutive right vertebral that terminates in PICA and ACOM   -Hemoglobin A1c 5.4  -  -MRI cervical spine 9/28/2022 with multilevel degenerative change and ventral cord indentation at multiple levels, had been referred to neurosurgery and spine medicine     Paresthesias along with cervical spondylosis, he likely has thoracic and lumbar spondylosis leading to radiculopathy/myelopathy and abdominal paresthesias  Myofascial pain, cervical spondylosis with cord abutment and flattening we discussed his cervical spondylosis is leading to his cervicogenic headaches and scalp dysesthesias.  He has not seen improvement with multiple trials of neuropathic pain medications.  He has tried injections in the past.  Has done PT in the past.    -MRI thoracic and lumbar spine   -Has been referred to spine medicine and neurosurgery in the past, agreeable to try again    Follow up in 3 months     Discussed indication, administration, dose, and side effects with patient of any medications  personally prescribed. Patient was advised to let my office know if they have any questions or concerns.       Today, I personally spent 20 minutes in this case, including chart review, time spent with patient doing face to face evaluation w/ interview and exam and patient education, counseling, and time was spent in patient education, counseling, and coordination of care as described above.   Issues discussed: Diagnosis and implications on future health, benefits and side effects of present and future medications, test results as well as further testing and medications required.    This note was prepared using Dragon Medical voice recognition dictation software and as a result, errors may occur. When identified, these errors have been corrected. While every attempt is made to correct errors during dictation, discrepancies may still exist    CARLYN Linder DO   Staff Physician, Neurology   4/23/2024  9:05 AM

## 2024-05-29 ENCOUNTER — TELEPHONE (OUTPATIENT)
Dept: NEUROLOGY | Facility: CLINIC | Age: 59
End: 2024-05-29

## 2024-05-29 NOTE — TELEPHONE ENCOUNTER
Pt sister called in advised they scheduled June 13th 2024 but next available is August 15th 2024 added pt to waitlist. Pt sister is wondering if able to get anything sooner for dr lopez to be able to review imaging. Pls advise.

## 2024-06-03 ENCOUNTER — OFFICE VISIT (OUTPATIENT)
Dept: FAMILY MEDICINE CLINIC | Facility: CLINIC | Age: 59
End: 2024-06-03
Payer: MEDICARE

## 2024-06-03 VITALS
OXYGEN SATURATION: 96 % | TEMPERATURE: 98 F | HEIGHT: 66 IN | DIASTOLIC BLOOD PRESSURE: 82 MMHG | SYSTOLIC BLOOD PRESSURE: 120 MMHG | WEIGHT: 211.63 LBS | BODY MASS INDEX: 34.01 KG/M2 | HEART RATE: 78 BPM

## 2024-06-03 DIAGNOSIS — H40.9 GLAUCOMA OF BOTH EYES, UNSPECIFIED GLAUCOMA TYPE: ICD-10-CM

## 2024-06-03 DIAGNOSIS — R97.20 ELEVATED PSA: ICD-10-CM

## 2024-06-03 DIAGNOSIS — H34.9 RETINAL ARTERY OCCLUSION: ICD-10-CM

## 2024-06-03 DIAGNOSIS — Z00.00 ADULT GENERAL MEDICAL EXAMINATION: Primary | ICD-10-CM

## 2024-06-03 DIAGNOSIS — R20.0 NUMBNESS AND TINGLING: ICD-10-CM

## 2024-06-03 DIAGNOSIS — I10 ESSENTIAL HYPERTENSION: ICD-10-CM

## 2024-06-03 DIAGNOSIS — R20.2 NUMBNESS AND TINGLING: ICD-10-CM

## 2024-06-03 DIAGNOSIS — E66.09 CLASS 1 OBESITY DUE TO EXCESS CALORIES WITHOUT SERIOUS COMORBIDITY WITH BODY MASS INDEX (BMI) OF 34.0 TO 34.9 IN ADULT: ICD-10-CM

## 2024-06-03 DIAGNOSIS — M54.2 CERVICALGIA: ICD-10-CM

## 2024-06-03 RX ORDER — AMLODIPINE BESYLATE 10 MG/1
10 TABLET ORAL DAILY
Qty: 90 TABLET | Refills: 1 | Status: SHIPPED | OUTPATIENT
Start: 2024-06-03

## 2024-06-03 RX ORDER — ASPIRIN 81 MG/1
81 TABLET ORAL DAILY
Qty: 90 TABLET | Refills: 1 | Status: SHIPPED | OUTPATIENT
Start: 2024-06-03

## 2024-06-03 NOTE — PROGRESS NOTES
Chief Complaint:   Chief Complaint   Patient presents with    Follow - Up       HPI:   This is a 58 year old male coming in for physical and follow-up. Hx HTN, BP at goal on amlodipine 10mg daily. Does not monitor BP at home. Has had a hx of paresthesias that extend to scalp and forehead, also having a persistent paresthesia to left side of abdomen. Prior abdominal workup did not show anything in this space. He feels this sensation related to movement and feels like a pulling in that area. Has mild constipation at times improved with prune juice, otherwise no abdominal symptoms. Seeing neurology and has been scheduled for an MRI of the spine, referred to neurosurgery as well. Sees ophthalmology regularly for glaucoma and hx of retinal artery occlusion, seeing this provider this month. Sees urology for elevated PSA, was recommended to have a prostate MRI and biopsy but did not follow-up.     Results for orders placed or performed during the hospital encounter of 04/16/24   Basic Metabolic Panel (8)   Result Value Ref Range    Glucose 107 (H) 70 - 99 mg/dL    Sodium 142 136 - 145 mmol/L    Potassium 4.2 3.5 - 5.1 mmol/L    Chloride 109 98 - 112 mmol/L    CO2 26.0 21.0 - 32.0 mmol/L    Anion Gap 7 0 - 18 mmol/L    BUN 11 9 - 23 mg/dL    Creatinine 1.16 0.70 - 1.30 mg/dL    BUN/CREA Ratio 9.5 (L) 10.0 - 20.0    Calcium, Total 9.3 8.7 - 10.4 mg/dL    Calculated Osmolality 294 275 - 295 mOsm/kg    eGFR-Cr 72 >=60 mL/min/1.73m2   Troponin I (High Sensitivity)   Result Value Ref Range    Troponin I (High Sensitivity) 16 <=53 ng/L   EKG   Result Value Ref Range    Ventricular rate 74 BPM    Atrial rate 74 BPM    P-R Interval 172 ms    QRS Duration 84 ms    Q-T Interval 372 ms    QTC Calculation (Bezet) 412 ms    P Axis 55 degrees    R Axis 59 degrees    T Axis 29 degrees   RAINBOW DRAW LAVENDER   Result Value Ref Range    Hold Lavender Auto Resulted    RAINBOW DRAW LIGHT GREEN   Result Value Ref Range    Hold Lt Green Auto  Resulted    RAINBOW DRAW BLUE   Result Value Ref Range    Hold Blue Auto Resulted    RAINBOW DRAW GOLD   Result Value Ref Range    Hold Gold Auto Resulted    CBC W/ DIFFERENTIAL   Result Value Ref Range    WBC 7.3 4.0 - 11.0 x10(3) uL    RBC 5.35 4.30 - 5.70 x10(6)uL    HGB 16.9 13.0 - 17.5 g/dL    HCT 48.3 39.0 - 53.0 %    MCV 90.3 80.0 - 100.0 fL    MCH 31.6 26.0 - 34.0 pg    MCHC 35.0 31.0 - 37.0 g/dL    RDW-SD 39.8 35.1 - 46.3 fL    RDW 12.0 11.0 - 15.0 %    .0 150.0 - 450.0 10(3)uL    Neutrophil Absolute Prelim 5.11 1.50 - 7.70 x10 (3) uL    Neutrophil Absolute 5.11 1.50 - 7.70 x10(3) uL    Lymphocyte Absolute 1.30 1.00 - 4.00 x10(3) uL    Monocyte Absolute 0.74 0.10 - 1.00 x10(3) uL    Eosinophil Absolute 0.06 0.00 - 0.70 x10(3) uL    Basophil Absolute 0.05 0.00 - 0.20 x10(3) uL    Immature Granulocyte Absolute 0.03 0.00 - 1.00 x10(3) uL    Neutrophil % 70.1 %    Lymphocyte % 17.8 %    Monocyte % 10.2 %    Eosinophil % 0.8 %    Basophil % 0.7 %    Immature Granulocyte % 0.4 %             Past Medical History:    Essential hypertension    Glaucoma     History reviewed. No pertinent surgical history.  Social History:  Social History     Socioeconomic History    Marital status: Single   Tobacco Use    Smoking status: Never    Smokeless tobacco: Never   Vaping Use    Vaping status: Never Used   Substance and Sexual Activity    Alcohol use: Not Currently     Alcohol/week: 2.0 - 3.0 standard drinks of alcohol     Types: 2 - 3 Cans of beer per week    Drug use: Never   Other Topics Concern    Caffeine Concern Yes     Comment: 1 cup daily    Exercise Yes     Comment: walks     Family History:  Family History   Problem Relation Age of Onset    Other (Other) Mother     Hypertension Sister     Other (Other) Sister     Other (Brain Aneurysm) Sister     Other (Other) Brother     Migraines Sister      Allergies:  No Known Allergies  Current Meds:  Current Outpatient Medications   Medication Sig Dispense Refill     amLODIPine 10 MG Oral Tab Take 1 tablet (10 mg total) by mouth daily. 90 tablet 1    aspirin (ASPIRIN LOW DOSE) 81 MG Oral Tab EC Take 1 tablet (81 mg total) by mouth daily. 90 tablet 1    dorzolamide-timolol 2-0.5 % Ophthalmic Solution Place 1 drop into both eyes 2 (two) times daily.      dorzolamide 2 % Ophthalmic Solution INSTILL 1 DROP IN BOTH EYES THREE TIMES DAILY AS DIRECTED      latanoprost 0.005 % Ophthalmic Solution INSTILL 1 DROP BOTH EYES EVERY DAY AT BEDTIME AS DIRECTED      Brimonidine Tartrate-Timolol 0.2-0.5 % Ophthalmic Solution Place 1 drop into both eyes every 12 (twelve) hours.        Counseling given: No       REVIEW OF SYSTEMS:   CONSTITUTIONAL:  Denies unusual weight gain/loss, fever, chills, or fatigue.  HEENT:  Eyes:  Denies eye pain. Paresthesias on head and neck as above, feels vision is \"glossy\" at times, sees ophthalmology. No acute changes. Denies hearing loss, sneezing, congestion, runny nose or sore throat.  INTEGUMENTARY:  Denies rashes, itching, skin lesion.  CARDIOVASCULAR:  Denies chest pain, palpitations, edema, dyspnea on exertion or at rest.  RESPIRATORY:  Denies shortness of breath, wheezing, cough or sputum.  GASTROINTESTINAL:  Denies abdominal pain, nausea, vomiting, diarrhea, or blood in stool. Occasional constipation improved with prune juice.  GENITOURINARY: Denies dysuria, hematuria, frequency.  MUSCULOSKELETAL:  Denies weakness, muscle aches, back pain, joint pain, swelling or stiffness.  NEUROLOGICAL:  Denies headache, seizures, dizziness.  PSYCHIATRIC:  Denies depression or anxiety. Denies suicidal thoughts.    EXAM:   /82 (BP Location: Left arm, Patient Position: Sitting, Cuff Size: adult)   Pulse 78   Temp 98 °F (36.7 °C)   Ht 5' 6\" (1.676 m)   Wt 211 lb 9.6 oz (96 kg)   SpO2 96%   BMI 34.15 kg/m²  Estimated body mass index is 34.15 kg/m² as calculated from the following:    Height as of this encounter: 5' 6\" (1.676 m).    Weight as of this encounter:  211 lb 9.6 oz (96 kg).   Vital signs reviewed.Appears stated age, well groomed, in no acute distress.  Physical Exam:  GEN:  Patient is alert, awake and oriented, well developed, well nourished.  HEENT:  Head:  Normocephalic, atraumatic Eyes: EOMI, PERRLA, conjunctivae clear bilaterally, no eye discharge Ears: External normal, TM clear bilaterally. Nose: patent, no nasal discharge. Throat:  No tonsillar erythema or exudate.  Mouth:  No oral lesions, good dentition.  NECK: Supple, no CLAD, no thyromegaly.  SKIN: No rashes, no skin lesion, no bruising, good turgor.  HEART:  Regular rate and rhythm, no murmurs, rubs or gallops.  LUNGS: Clear to auscultation bilterally, no rales/rhonchi/wheezing.  ABDOMEN:  Soft, nondistended, nontender, bowel sounds normal in all 4 quadrants, no masses, no hepatosplenomegaly.  BACK: No tenderness to palpation, FROM.  EXTREMITIES:  No edema, no cyanosis, no clubbing, FROM, 2+ dorsalis pedis pulses bilaterally.  NEURO:  No deficit, normal gait, strength and tone, sensory intact, normal reflexes.    ASSESSMENT AND PLAN:   1. Adult general medical examination  -Healthy diet and regular exercise.  -Annual eye exam and biannual dental visits.  -Recommended TDaP and Zoster vaccines, he declines.   -Labs as below.  - CBC With Differential With Platelet  - Comp Metabolic Panel (14)  - Hemoglobin A1C; Future  - Lipid Panel  - Assay, Thyroid Stim Hormone  - PSA Total, Screen; Future    2. Essential hypertension  -Stable, CPM. Follow-up 6 months.  - CBC With Differential With Platelet  - Comp Metabolic Panel (14)  - amLODIPine 10 MG Oral Tab; Take 1 tablet (10 mg total) by mouth daily.  Dispense: 90 tablet; Refill: 1    3. Numbness and tingling  -Followed by neurology and having MRI spine, referred to neurosurgery.    4. Elevated PSA  -Discussed importance of follow-up with urology. Will repeat PSA now. Denies symptoms.  - PSA Total, Screen; Future    5. Retinal artery occlusion  -Followed by  ophthalmology.  - aspirin (ASPIRIN LOW DOSE) 81 MG Oral Tab EC; Take 1 tablet (81 mg total) by mouth daily.  Dispense: 90 tablet; Refill: 1    6. Glaucoma of both eyes, unspecified glaucoma type  -See above.    7. Cervicalgia  -See above.    8. Class 1 obesity due to excess calories without serious comorbidity with body mass index (BMI) of 34.0 to 34.9 in adult  -Will check A1C.  - Hemoglobin A1C; Future  - Lipid Panel      Meds & Refills for this Visit:  Requested Prescriptions     Signed Prescriptions Disp Refills    amLODIPine 10 MG Oral Tab 90 tablet 1     Sig: Take 1 tablet (10 mg total) by mouth daily.    aspirin (ASPIRIN LOW DOSE) 81 MG Oral Tab EC 90 tablet 1     Sig: Take 1 tablet (81 mg total) by mouth daily.         Problem List:  Patient Active Problem List   Diagnosis    Essential hypertension    Cervicalgia    Glaucoma of both eyes    Elevated PSA    Myofascial pain    Numbness and tingling    Retinal artery occlusion    Chronic midline low back pain with bilateral sciatica    Abdominal pain       Fiona Fiore, DIANA  6/3/2024  2:03 PM

## 2024-06-13 ENCOUNTER — HOSPITAL ENCOUNTER (OUTPATIENT)
Dept: MRI IMAGING | Age: 59
Discharge: HOME | End: 2024-06-13
Attending: Other
Payer: MEDICARE

## 2024-06-13 DIAGNOSIS — R20.2 PARESTHESIAS: ICD-10-CM

## 2024-06-13 DIAGNOSIS — M47.812 CERVICAL SPONDYLOSIS: ICD-10-CM

## 2024-06-13 PROCEDURE — 72148 MRI LUMBAR SPINE W/O DYE: CPT | Performed by: OTHER

## 2024-06-13 PROCEDURE — 72146 MRI CHEST SPINE W/O DYE: CPT | Performed by: OTHER

## 2024-06-27 ENCOUNTER — OFFICE VISIT (OUTPATIENT)
Dept: NEUROLOGY | Facility: CLINIC | Age: 59
End: 2024-06-27

## 2024-06-27 ENCOUNTER — TELEPHONE (OUTPATIENT)
Dept: NEUROLOGY | Facility: CLINIC | Age: 59
End: 2024-06-27

## 2024-06-27 VITALS — BODY MASS INDEX: 33.91 KG/M2 | WEIGHT: 211 LBS | HEIGHT: 66 IN

## 2024-06-27 DIAGNOSIS — H02.403 PTOSIS OF BOTH EYELIDS: Primary | ICD-10-CM

## 2024-06-27 DIAGNOSIS — G44.86 CERVICOGENIC HEADACHE: ICD-10-CM

## 2024-06-27 DIAGNOSIS — F45.0 SOMATIZATION DISORDER: ICD-10-CM

## 2024-06-27 DIAGNOSIS — M47.12 CERVICAL SPONDYLOSIS WITH MYELOPATHY: ICD-10-CM

## 2024-06-27 DIAGNOSIS — R20.2 PARESTHESIAS: ICD-10-CM

## 2024-06-27 DIAGNOSIS — M54.14 THORACIC RADICULOPATHY: ICD-10-CM

## 2024-06-27 DIAGNOSIS — R20.8 DYSESTHESIA OF SCALP: ICD-10-CM

## 2024-06-27 PROCEDURE — 99214 OFFICE O/P EST MOD 30 MIN: CPT | Performed by: OTHER

## 2024-06-27 RX ORDER — LIDOCAINE 50 MG/G
OINTMENT TOPICAL
Qty: 30 G | Refills: 0 | Status: SHIPPED | OUTPATIENT
Start: 2024-06-27

## 2024-06-27 NOTE — PROGRESS NOTES
[facility]  [address], [address]  [facility] [address]  [phone]          Established  Follow Up Visit       Date: June 27, 2024  Patient Name: Shawn Cortez   MRN: [id number]  Primary physician: [address]  [place] [address]    Interval History:   -- MRIs were done.  Is good to see spine medicine about July.  He still has paresthesias in his hand, imbalance and heavy eyes.    -Had yearly physical.    --He feels like his symptoms are much more bothersome.  Does not like the numbness, does not really have tingling or pins or needles.      --He has tingling and stinging in his right flank and abdominal     --Pressure in his eyes improved.      --Eyes feel heavy, \"gloss\" in eyes.      OUTPATIENT MEDICATIONS  Current Outpatient Medications on File Prior to Visit   Medication Sig Dispense Refill    amLODIPine 10 MG Oral Tab Take 1 tablet (10 mg total) by mouth daily. 90 tablet 1    aspirin (ASPIRIN LOW DOSE) 81 MG Oral Tab EC Take 1 tablet (81 mg total) by mouth daily. 90 tablet 1    dorzolamide-timolol 2-0.5 % Ophthalmic Solution Place 1 drop into both eyes 2 (two) times daily.      dorzolamide 2 % Ophthalmic Solution INSTILL 1 DROP IN BOTH EYES THREE TIMES DAILY AS DIRECTED      latanoprost 0.005 % Ophthalmic Solution INSTILL 1 DROP BOTH EYES EVERY DAY AT BEDTIME AS DIRECTED      Brimonidine Tartrate-Timolol 0.2-0.5 % Ophthalmic Solution Place 1 drop into both eyes every 12 (twelve) hours.       No current facility-administered medications on file prior to visit.         PHYSICAL EXAM:     Ht 66\"   Wt 211 lb (95.7 kg)   BMI 34.06 kg/m²   General appearance: Well appearing, and in no acute distress  Skin: skin color, texture normal.  No rashes or lesions.    Head: Normocephalic, atraumatic.    Neurological exam:    Mental Status:   Attention/Concentration: intact attention on bedside test   Fund of knowledge: reduced   Speech: no dysarthria or aphasia     No tenderness  over right XAVI/SERGIO     Bilateral ptosis, worse on right    LABS/DATA:  Reviewed CBC and BMP      IMAGING:  MRI thoracic spine  Mild spondylosis with multiple levels of disc protrusions     MRI lumbar spine with  Mild multilevel spondylotic changes  I PERSONALLY REVIEWED THESE IMAGES.     ASSESSMENT:  The patient is a 58 year old man with past medical history of left central retinal artery occlusion (discovered incidentally), essential hypertension, glaucoma, developmental delay with childhood epilepsy who presents for follow up.       -MRI brain 7/23/2021: Motion degraded; no acute intracranial process  -Carotid ultrasound 8/25/2021: No significant stenosis  -CT C spine 9/10/2021: with multilevel degenerative disc disease and right C4 nerve impingement.  Mild canal narrowing C3 C6  -CTA brain 9/10/2021 with no aneurysm; incidental diminutive right vertebral that terminates in PICA and ACOM   -Hemoglobin A1c 5.4  -  -MRI cervical spine 9/28/2022 with multilevel degenerative change and ventral cord indentation at multiple levels, had been referred to neurosurgery and spine medicine  -MRI thoracic spine: Mild spondylosis with multiple levels of disc protrusions   -MRI lumbar spine with: Mild multilevel spondylotic changes      Right > left ptosis   -Myasthenia gravis labs  -CTA head/neck (worsened ptosis now)        Paresthesias and scalp dysesthesias likely related to myofascial pain syndrome, cervical spondylosis with myelopathy   ?Thoracic radiculopathy leading to abdominal pain - does not have a significant amount of neural compromise on imaging however   Cervicogenic headaches and scalp dysesthesias.  He has not seen improvement with multiple trials of neuropathic pain medications, muscle relaxers.  He has tried injections in the past.  Has done PT in the past.    Somatization   -Has follow up with spine medicine and spine surgeon   -Topical lidocaine     Follow up: 3 months     Discussed indication,  administration, dose, and side effects with patient of any medications personally prescribed. Patient was advised to let my office know if they have any questions or concerns.       Today, I personally spent 20 minutes in this case, including chart review, time spent with patient doing face to face evaluation w/ interview and exam and patient education, counseling, and time was spent in patient education, counseling, and coordination of care as described above.   Issues discussed: Diagnosis and implications on future health, benefits and side effects of present and future medications, test results as well as further testing and medications required.    This note was prepared using Dragon Medical voice recognition dictation software and as a result, errors may occur. When identified, these errors have been corrected. While every attempt is made to correct errors during dictation, discrepancies may still exist    CARLYN Linder DO   Staff Physician, Neurology   6/27/2024  11:43 AM

## 2024-06-28 ENCOUNTER — MED REC SCAN ONLY (OUTPATIENT)
Dept: NEUROLOGY | Facility: CLINIC | Age: 59
End: 2024-06-28

## 2024-06-28 NOTE — TELEPHONE ENCOUNTER
PA approval letter received from [facility]  Lidocaine 5% ointment 30 grams per 30 days APPROVED through 12/31/24      Letter sent to scanning

## 2024-07-10 ENCOUNTER — OFFICE VISIT (OUTPATIENT)
Dept: SURGERY | Facility: CLINIC | Age: 59
End: 2024-07-10
Payer: MEDICARE

## 2024-07-10 VITALS
DIASTOLIC BLOOD PRESSURE: 83 MMHG | SYSTOLIC BLOOD PRESSURE: 122 MMHG | WEIGHT: 211 LBS | HEART RATE: 86 BPM | HEIGHT: 66 IN | BODY MASS INDEX: 33.91 KG/M2

## 2024-07-10 DIAGNOSIS — M54.81 OCCIPITAL NEURALGIA OF RIGHT SIDE: ICD-10-CM

## 2024-07-10 DIAGNOSIS — M54.2 CERVICALGIA: Primary | ICD-10-CM

## 2024-07-10 NOTE — PROGRESS NOTES
Neurosurgery Clinic Visit  7/10/2024    Shawn Cortez PCP:  Nikos Vanegas MD    1965 MRN [id number]       CHIEF COMPLAINT:  Chief Complaint   Patient presents with    Consult       HISTORY OF PRESENT ILLNESS:  Shawn Cortez is a(n) 58 year old male presents today for neurosurgical consultation.  He is accompanied by his sister.    His primary concern is numbness and tingling over the right side of his back, neck, and head.  This has been ongoing for about 2 months.  No specific inciting event.  It is not significantly painful.    He denies fine motor difficulties or dropping objects.  He does endorse some balance difficulty.    Medical history is significant for hypertension and glaucoma.    REVIEW OF SYSTEMS:  The 12 point checklist was reviewed and was negative except: As noted in HPI    ALLERGIES:  No Known Allergies    MEDICATIONS:  Current Outpatient Medications   Medication Sig Dispense Refill    lidocaine 5 % External Ointment -Apply thin layer to affected area up to three times daily as needed for pain/stinging/tingling. -Use gloves when you apply Lidocaine cream or gel so you do not make your hand numb. 30 g 0    amLODIPine 10 MG Oral Tab Take 1 tablet (10 mg total) by mouth daily. 90 tablet 1    aspirin (ASPIRIN LOW DOSE) 81 MG Oral Tab EC Take 1 tablet (81 mg total) by mouth daily. 90 tablet 1    dorzolamide-timolol 2-0.5 % Ophthalmic Solution Place 1 drop into both eyes 2 (two) times daily.      dorzolamide 2 % Ophthalmic Solution INSTILL 1 DROP IN BOTH EYES THREE TIMES DAILY AS DIRECTED      latanoprost 0.005 % Ophthalmic Solution INSTILL 1 DROP BOTH EYES EVERY DAY AT BEDTIME AS DIRECTED      Brimonidine Tartrate-Timolol 0.2-0.5 % Ophthalmic Solution Place 1 drop into both eyes every 12 (twelve) hours.         HISTORY:  Past Medical History:    Essential hypertension    Glaucoma     No past surgical history on file.  Family History   Problem Relation Age of Onset    Other (Other) Mother      Hypertension Sister     Other (Other) Sister     Other (Brain Aneurysm) Sister     Other (Other) Brother     Migraines Sister      Shawn  reports that he has never smoked. He has never used smokeless tobacco. He reports that he does not currently use alcohol after a past usage of about 2.0 - 3.0 standard drinks of alcohol per week. He reports that he does not use drugs.    PHYSICAL EXAMINATION:  Vital Signs:  /83 (BP Location: Left arm, Patient Position: Sitting, Cuff Size: large)   Pulse 86   Ht 66\"   Wt 211 lb (95.7 kg)   BMI 34.06 kg/m²   On examination, cranial nerves are intact.  Strength is 5/5 throughout.  Sensation is intact.  Reflexes are 1+ and symmetric.  No Miladis's or clonus.    REVIEW OF STUDIES:  MRI scan of the thoracic and lumbar spine were reviewed personally.  These demonstrate multilevel spondylosis.  There is no high-grade central stenosis.  No cord compression within the thoracic spine.      ASSESSMENT AND PLAN:  1.  Right-sided numbness.    2.  Gait instability.    The primary concern seems to be the numbness in the back of his head.  This may be related to occipital neuralgia.    I would also like to assess for cervical myelopathy.  I have prescribed an MRI scan of the cervical spine, as well as cervical flexion-extension x-rays.    Return to the office in 3 weeks or so, once the studies have been completed.    Time spent on counseling/coordination of care:  15 Minutes    Total Time spent with patient:  15 Minutes        7/10/2024  2:00 PM

## 2024-07-10 NOTE — PROGRESS NOTES
New patient:  Reason for visit: New patient presents to clinic complaining of right sided neck and head numbness. He states that pain and tingling starts at his lower back pain and radiates to right shoulder.  He feels that his eyes feel heavy and glossy. Patient has imbalance issues. He states he has a history of seizures as a child.    Estimated time of onset:   2 month(s)    Numeric Rating Scale: (No Pain) 0  to  10 (Worst Pain)        Pain at Present:  0/10                                                                                                                       Distribution of Pain:    right    Prior diagnostic testing related to this condition:  MRI in chart

## 2024-07-11 ENCOUNTER — HOSPITAL ENCOUNTER (OUTPATIENT)
Dept: GENERAL RADIOLOGY | Age: 59
Discharge: HOME | End: 2024-07-11
Attending: NEUROLOGICAL SURGERY
Payer: MEDICARE

## 2024-07-11 DIAGNOSIS — M54.81 OCCIPITAL NEURALGIA OF RIGHT SIDE: ICD-10-CM

## 2024-07-11 DIAGNOSIS — M54.2 CERVICALGIA: ICD-10-CM

## 2024-07-11 PROCEDURE — 72052 X-RAY EXAM NECK SPINE 6/>VWS: CPT | Performed by: NEUROLOGICAL SURGERY

## 2024-07-16 ENCOUNTER — OFFICE VISIT (OUTPATIENT)
Dept: PHYSICAL MEDICINE AND REHAB | Facility: CLINIC | Age: 59
End: 2024-07-16
Payer: MEDICARE

## 2024-07-16 DIAGNOSIS — M79.18 MYOFASCIAL PAIN: Primary | ICD-10-CM

## 2024-07-16 DIAGNOSIS — H40.9 GLAUCOMA OF BOTH EYES, UNSPECIFIED GLAUCOMA TYPE: ICD-10-CM

## 2024-07-16 DIAGNOSIS — M54.2 CERVICALGIA: ICD-10-CM

## 2024-07-16 PROCEDURE — 99214 OFFICE O/P EST MOD 30 MIN: CPT | Performed by: PHYSICAL MEDICINE & REHABILITATION

## 2024-07-16 RX ORDER — DULOXETIN HYDROCHLORIDE 20 MG/1
20 CAPSULE, DELAYED RELEASE ORAL DAILY
Qty: 30 CAPSULE | Refills: 0 | Status: SHIPPED | OUTPATIENT
Start: 2024-07-16 | End: 2024-07-16 | Stop reason: CLARIF

## 2024-07-16 NOTE — PROGRESS NOTES
[facility]  Progress Note    CHIEF COMPLAINT:    Chief Complaint   Patient presents with    Numbness     LOV: 09/12/2023. Pt c/o numbness starting in the RIGHT low back radiating up into head, starting a few months ago. Denies injury/trauma. Also c/o \"glossiness & heaviness\" to eyes, hx: glaucoma (x3 years). Pt also c/o balancing issues starting at the same time as the numbness. Pt saw Dr. Linder & Dr. Chen. XR CERVICAL completed. CERVICAL MRI scheduled for: 07/20/2024.       History of Present Illness:  Shawn Cortez is a 58 year old male who presents today for follow up for symptoms of right low back pain rating up to the head.  I last saw him in September 2023 for myofascial pain.  He has responded to trigger point injections in the past for right-sided neck pain.  He had a cervical CT showing degenerative foraminal narrowing.  He saw Dr. Chen a few days ago who thought he had symptoms of occipital neuralgia and ordered a new cervical MRI to assess for cervical myelopathy.  He sees Dr. Linder for paresthesias.     His chief complaint today is not pain but numbness over the right posterior aspect of the skull, neck and posterior torso.  No exacerbating or remitting factors.    PAST MEDICAL HISTORY:  Past Medical History:    Essential hypertension    Glaucoma       SURGICAL HISTORY:  No past surgical history on file.    SOCIAL HISTORY:   Social History     Occupational History    Not on file   Tobacco Use    Smoking status: Never    Smokeless tobacco: Never   Vaping Use    Vaping status: Never Used   Substance and Sexual Activity    Alcohol use: Not Currently     Alcohol/week: 2.0 - 3.0 standard drinks of alcohol     Types: 2 - 3 Cans of beer per week    Drug use: Never    Sexual activity: Not on file       CURRENT MEDICATIONS:   Current Outpatient Medications   Medication Sig Dispense Refill    lidocaine 5 % External Ointment -Apply thin layer to affected area up to  three times daily as needed for pain/stinging/tingling. -Use gloves when you apply Lidocaine cream or gel so you do not make your hand numb. 30 g 0    amLODIPine 10 MG Oral Tab Take 1 tablet (10 mg total) by mouth daily. 90 tablet 1    aspirin (ASPIRIN LOW DOSE) 81 MG Oral Tab EC Take 1 tablet (81 mg total) by mouth daily. 90 tablet 1    dorzolamide-timolol 2-0.5 % Ophthalmic Solution Place 1 drop into both eyes 2 (two) times daily.      dorzolamide 2 % Ophthalmic Solution INSTILL 1 DROP IN BOTH EYES THREE TIMES DAILY AS DIRECTED      latanoprost 0.005 % Ophthalmic Solution INSTILL 1 DROP BOTH EYES EVERY DAY AT BEDTIME AS DIRECTED      Brimonidine Tartrate-Timolol 0.2-0.5 % Ophthalmic Solution Place 1 drop into both eyes every 12 (twelve) hours.         ALLERGIES:   No Known Allergies      PHYSICAL EXAM:   There were no vitals taken for this visit.    There is no height or weight on file to calculate BMI.      General: No immediate distress  Extremities: No upper extremity edema bilaterally   Spine: full and painfree cervical ROM in all directions, tender over the cervical paraspinals to light touch   Neuro:   Cognition: alert & oriented x 3, attentive, comprehention intact, spontaneous speech intact  Strength:  Upper extremities have 5/5 strength  Sensation: Normal upper extremities  Reflexes: Normal upper extremities  Spurling's sign: neg        Data    Radiology Imagin.  I personally reviewed a recent cervical plain film x-ray which shows straight spine and disc degeneration.  No subluxation on flexion-extension.  2. I reviewed a cervical CT showing multilevel foraminal narrowing at most cervical levels due to mild osteoarthritic change with the exception of C6-7 and C7-T1.   3.  I personally reviewed a cervical MRI from 2022 showing loss of lordosis, central canal stenosis from C3-4 thru C7-T1, motion artifact, cord difficult to assess, no obvious focal edema.    ASSESSMENT AND PLAN:  1.  Myofascial pain  He is tender to light palpation which is chronic.  His chief complaint is not pain.  He may be a candidate for Cymbalta but given his history of seizures I would like to discuss first with Dr. Linder, additionally with history of glaucoma would like to discuss with Dr. Garcia.  Also will be interested to see the repeat cervical spine MRI upcoming.    2. Cervicalgia  Chronic and myofascial        RTC as needed        The patient was in agreement with the assessment and plan.  All questions were answered.        Dilip Steele MD  Physical Medicine and Rehabilitation/Sports Medicine  [facility]

## 2024-07-20 ENCOUNTER — HOSPITAL ENCOUNTER (OUTPATIENT)
Dept: MRI IMAGING | Facility: HOSPITAL | Age: 59
Discharge: HOME | End: 2024-07-20
Attending: NEUROLOGICAL SURGERY
Payer: MEDICARE

## 2024-07-20 DIAGNOSIS — M54.81 OCCIPITAL NEURALGIA OF RIGHT SIDE: ICD-10-CM

## 2024-07-20 DIAGNOSIS — M54.2 CERVICALGIA: ICD-10-CM

## 2024-07-20 PROCEDURE — 72141 MRI NECK SPINE W/O DYE: CPT | Performed by: NEUROLOGICAL SURGERY

## 2024-07-31 ENCOUNTER — OFFICE VISIT (OUTPATIENT)
Dept: SURGERY | Facility: CLINIC | Age: 59
End: 2024-07-31
Payer: MEDICARE

## 2024-07-31 ENCOUNTER — LAB ENCOUNTER (OUTPATIENT)
Dept: LAB | Facility: HOSPITAL | Age: 59
End: 2024-07-31
Attending: Other
Payer: MEDICARE

## 2024-07-31 VITALS
WEIGHT: 211 LBS | DIASTOLIC BLOOD PRESSURE: 78 MMHG | BODY MASS INDEX: 33.91 KG/M2 | HEART RATE: 82 BPM | HEIGHT: 66 IN | SYSTOLIC BLOOD PRESSURE: 124 MMHG

## 2024-07-31 DIAGNOSIS — Z00.00 ADULT GENERAL MEDICAL EXAMINATION: ICD-10-CM

## 2024-07-31 DIAGNOSIS — M54.81 OCCIPITAL NEURALGIA OF RIGHT SIDE: ICD-10-CM

## 2024-07-31 DIAGNOSIS — R97.20 ELEVATED PSA: ICD-10-CM

## 2024-07-31 DIAGNOSIS — E66.09 CLASS 1 OBESITY DUE TO EXCESS CALORIES WITHOUT SERIOUS COMORBIDITY WITH BODY MASS INDEX (BMI) OF 34.0 TO 34.9 IN ADULT: ICD-10-CM

## 2024-07-31 DIAGNOSIS — M54.2 CERVICALGIA: Primary | ICD-10-CM

## 2024-07-31 DIAGNOSIS — H02.403 PTOSIS OF BOTH EYELIDS: ICD-10-CM

## 2024-07-31 LAB
ALBUMIN SERPL-MCNC: 4.8 G/DL (ref 3.2–4.8)
ALBUMIN/GLOB SERPL: 1.6 {RATIO} (ref 1–2)
ALP LIVER SERPL-CCNC: 87 U/L
ALT SERPL-CCNC: 33 U/L
ANION GAP SERPL CALC-SCNC: 7 MMOL/L (ref 0–18)
AST SERPL-CCNC: 27 U/L (ref ?–34)
BASOPHILS # BLD AUTO: 0.06 X10(3) UL (ref 0–0.2)
BASOPHILS NFR BLD AUTO: 0.7 %
BILIRUB SERPL-MCNC: 1.6 MG/DL (ref 0.2–1.1)
BUN BLD-MCNC: 13 MG/DL (ref 9–23)
BUN/CREAT SERPL: 11.9 (ref 10–20)
CALCIUM BLD-MCNC: 9.3 MG/DL (ref 8.7–10.4)
CHLORIDE SERPL-SCNC: 107 MMOL/L (ref 98–112)
CHOLEST SERPL-MCNC: 198 MG/DL (ref ?–200)
CO2 SERPL-SCNC: 26 MMOL/L (ref 21–32)
COMPLEXED PSA SERPL-MCNC: 7.94 NG/ML (ref ?–4)
CREAT BLD-MCNC: 1.09 MG/DL
DEPRECATED RDW RBC AUTO: 40.8 FL (ref 35.1–46.3)
EGFRCR SERPLBLD CKD-EPI 2021: 78 ML/MIN/1.73M2 (ref 60–?)
EOSINOPHIL # BLD AUTO: 0.05 X10(3) UL (ref 0–0.7)
EOSINOPHIL NFR BLD AUTO: 0.6 %
ERYTHROCYTE [DISTWIDTH] IN BLOOD BY AUTOMATED COUNT: 12 % (ref 11–15)
EST. AVERAGE GLUCOSE BLD GHB EST-MCNC: 111 MG/DL (ref 68–126)
FASTING PATIENT LIPID ANSWER: NO
FASTING STATUS PATIENT QL REPORTED: NO
GLOBULIN PLAS-MCNC: 3 G/DL (ref 2–3.5)
GLUCOSE BLD-MCNC: 91 MG/DL (ref 70–99)
HBA1C MFR BLD: 5.5 % (ref ?–5.7)
HCT VFR BLD AUTO: 47.2 %
HDLC SERPL-MCNC: 45 MG/DL (ref 40–59)
HGB BLD-MCNC: 16.2 G/DL
IMM GRANULOCYTES # BLD AUTO: 0.04 X10(3) UL (ref 0–1)
IMM GRANULOCYTES NFR BLD: 0.5 %
LDLC SERPL CALC-MCNC: 136 MG/DL (ref ?–100)
LYMPHOCYTES # BLD AUTO: 1.51 X10(3) UL (ref 1–4)
LYMPHOCYTES NFR BLD AUTO: 18 %
MCH RBC QN AUTO: 31.5 PG (ref 26–34)
MCHC RBC AUTO-ENTMCNC: 34.3 G/DL (ref 31–37)
MCV RBC AUTO: 91.7 FL
MONOCYTES # BLD AUTO: 0.78 X10(3) UL (ref 0.1–1)
MONOCYTES NFR BLD AUTO: 9.3 %
NEUTROPHILS # BLD AUTO: 5.93 X10 (3) UL (ref 1.5–7.7)
NEUTROPHILS # BLD AUTO: 5.93 X10(3) UL (ref 1.5–7.7)
NEUTROPHILS NFR BLD AUTO: 70.9 %
NONHDLC SERPL-MCNC: 153 MG/DL (ref ?–130)
OSMOLALITY SERPL CALC.SUM OF ELEC: 290 MOSM/KG (ref 275–295)
PLATELET # BLD AUTO: 330 10(3)UL (ref 150–450)
POTASSIUM SERPL-SCNC: 4.2 MMOL/L (ref 3.5–5.1)
PROT SERPL-MCNC: 7.8 G/DL (ref 5.7–8.2)
RBC # BLD AUTO: 5.15 X10(6)UL
SODIUM SERPL-SCNC: 140 MMOL/L (ref 136–145)
TRIGL SERPL-MCNC: 94 MG/DL (ref 30–149)
TSI SER-ACNC: 1.02 MIU/ML (ref 0.55–4.78)
VLDLC SERPL CALC-MCNC: 17 MG/DL (ref 0–30)
WBC # BLD AUTO: 8.4 X10(3) UL (ref 4–11)

## 2024-07-31 PROCEDURE — 85025 COMPLETE CBC W/AUTO DIFF WBC: CPT | Performed by: NURSE PRACTITIONER

## 2024-07-31 PROCEDURE — 80053 COMPREHEN METABOLIC PANEL: CPT | Performed by: NURSE PRACTITIONER

## 2024-07-31 PROCEDURE — 83519 RIA NONANTIBODY: CPT

## 2024-07-31 PROCEDURE — 83036 HEMOGLOBIN GLYCOSYLATED A1C: CPT

## 2024-07-31 PROCEDURE — 80061 LIPID PANEL: CPT | Performed by: NURSE PRACTITIONER

## 2024-07-31 PROCEDURE — 99213 OFFICE O/P EST LOW 20 MIN: CPT | Performed by: NEUROLOGICAL SURGERY

## 2024-07-31 PROCEDURE — 36415 COLL VENOUS BLD VENIPUNCTURE: CPT

## 2024-07-31 PROCEDURE — 84443 ASSAY THYROID STIM HORMONE: CPT | Performed by: NURSE PRACTITIONER

## 2024-07-31 PROCEDURE — 86381 MITOCHONDRIAL ANTIBODY EACH: CPT

## 2024-07-31 RX ORDER — DULOXETIN HYDROCHLORIDE 20 MG/1
CAPSULE, DELAYED RELEASE ORAL
COMMUNITY
Start: 2024-07-17

## 2024-07-31 NOTE — PROGRESS NOTES
Last Office Visit: 07/10/2024    Established patient presents to clinic for a follow up to review MRI and X-ray cervical scans. Patient has numbness and tingling of right side of his back, neck, and head.

## 2024-07-31 NOTE — PROGRESS NOTES
Neurosurgery Clinic Visit  2024    Shawn Cortez PCP:  Nikos Vanegas MD    1965 MRN [id number]     HISTORY OF PRESENT ILLNESS:  Shawn Cortez is a(n) 58 year old male dents today for neurosurgical follow-up.  Last office visit was July 10, 2024.  No change in his symptoms since then.      PHYSICAL EXAMINATION:  Vital Signs:  /78 (BP Location: Right arm, Patient Position: Sitting, Cuff Size: large)   Pulse 82   Ht 66\"   Wt 211 lb (95.7 kg)   BMI 34.06 kg/m²   Examination is stable.  Strength remains 5/5.  Reflexes are 1+ and symmetric.  No Miladis's or clonus.      REVIEW OF STUDIES:    I personally reviewed multiple imaging studies.    Flexion-extension x-rays demonstrate no evidence of instability.    Cervical MRI scan demonstrates congenital stenosis.  There is a small disc protrusion at C2-3, without evidence of cord compression.  There are smaller protrusions at C6-7 and C7-T1.  Multilevel moderate to severe foraminal stenosis is noted as well.      ASSESSMENT and PLAN:  1.  Numbness and tingling.  This may be attributable to the multilevel foraminal stenosis noted on the MRI scan.  I do not recommend any surgical intervention from this.    No evidence of spinal cord compression, nor of myelopathy.    I have encouraged him to follow-up with neurology as scheduled.  All questions were answered.  The patient and his sister voiced their understanding and agreement.        Time spent on counseling/coordination of care:  15 Minutes    Total time spent with patient:  15 Minutes        2024  5:58 PM     This report was dictated using voice recognition technology.  Errors in syntax or recognition may occur, and should not be construed to change the meaning of a sentence.

## 2024-08-13 LAB
ACHR BINDING ABS: <0.03 NMOL/L
ACHR BLOCKING ABS: 25 %
ACHR-MODULATING AB: 6 %
MUSK ABS, SERUM: <1 U/ML
STRIATIONAL ABS, SERUM: NEGATIVE

## 2024-08-22 ENCOUNTER — TELEPHONE (OUTPATIENT)
Dept: PHYSICAL MEDICINE AND REHAB | Facility: CLINIC | Age: 59
End: 2024-08-22

## 2024-08-22 RX ORDER — DULOXETIN HYDROCHLORIDE 30 MG/1
30 CAPSULE, DELAYED RELEASE ORAL DAILY
Qty: 30 CAPSULE | Refills: 0 | Status: SHIPPED | OUTPATIENT
Start: 2024-08-22

## 2024-08-22 NOTE — TELEPHONE ENCOUNTER
I discussed starting Cymbalta with Dr. Linder given history of seizures and Dr. Garcia given history of glaucoma.  No contraindications from Dr. Linder.  Cymbalta should be fine per Dr. Garcia if he continues with regular use of eyedrops.    Please let the family know that I discussed Cymbalta with his other physicians.  I will start him on 30 mg every morning.  Please see the order below.  It is important for him to take his eyedrops regularly because Cymbalta can theoretically interfere with his eye pressures, but this is okay with Dr. Garcia.  I recommend he return to see me in 1 month.

## 2024-08-26 RX ORDER — DULOXETIN HYDROCHLORIDE 20 MG/1
20 CAPSULE, DELAYED RELEASE ORAL DAILY
Qty: 30 CAPSULE | Refills: 0 | OUTPATIENT
Start: 2024-08-26

## 2024-08-26 NOTE — TELEPHONE ENCOUNTER
Refill Request    Medication request: DULoxetine (CYMBALTA) 20 MG Oral Cap DR Particles.  Take 1 capsule (20 mg total) by mouth daily.      LOV:7/16/2024 Dilip Steele MD   Due back to clinic per last office note:  He may be a candidate for Cymbalta but given his history of seizures I would like to discuss first with Dr. Linder, additionally with history of glaucoma would like to discuss with Dr. Garcia.   NOV: Visit date not found      [place]: 8/22/24 #30  (30 days)    Urine drug screen (if applicable): N/A  Pain contract: N/A    LOV plan (if weaning or changing medications): RTC as needed

## 2024-08-26 NOTE — TELEPHONE ENCOUNTER
Called patient and spoke with is sister. Also scheduled a follow up appointment on 09/30/24. Closing the encounter at this time.

## 2024-09-26 ENCOUNTER — OFFICE VISIT (OUTPATIENT)
Dept: NEUROLOGY | Facility: CLINIC | Age: 59
End: 2024-09-26
Payer: MEDICARE

## 2024-09-26 DIAGNOSIS — R20.8 DYSESTHESIA OF SCALP: Primary | ICD-10-CM

## 2024-09-26 DIAGNOSIS — M47.812 CERVICAL SPONDYLOSIS: ICD-10-CM

## 2024-09-26 DIAGNOSIS — F45.0 SOMATIZATION DISORDER: ICD-10-CM

## 2024-09-26 PROCEDURE — 99214 OFFICE O/P EST MOD 30 MIN: CPT | Performed by: OTHER

## 2024-09-26 RX ORDER — DULOXETIN HYDROCHLORIDE 30 MG/1
30 CAPSULE, DELAYED RELEASE ORAL DAILY
Qty: 90 CAPSULE | Refills: 0 | Status: SHIPPED | OUTPATIENT
Start: 2024-09-26 | End: 2024-09-30

## 2024-09-26 NOTE — PROGRESS NOTES
[facility]  [address], [address]  [place] [address]  [phone]          Established  Follow Up Visit       Date: September 26, 2024  Patient Name: Shawn Cortez   MRN: [id number]  Primary physician: [address] Stefan [facility] [place]  [place] [address]    Interval History:   --Does feel improvement of his paresthesias in Duloxetine 30 mg daily.  Feels like his ptosis is the same.  Feeling overall much better.        OUTPATIENT MEDICATIONS  Current Outpatient Medications on File Prior to Visit   Medication Sig Dispense Refill    DULoxetine (CYMBALTA) 30 MG Oral Cap DR Particles Take 1 capsule (30 mg total) by mouth daily. 30 capsule 0    lidocaine 5 % External Ointment -Apply thin layer to affected area up to three times daily as needed for pain/stinging/tingling. -Use gloves when you apply Lidocaine cream or gel so you do not make your hand numb. 30 g 0    amLODIPine 10 MG Oral Tab Take 1 tablet (10 mg total) by mouth daily. 90 tablet 1    aspirin (ASPIRIN LOW DOSE) 81 MG Oral Tab EC Take 1 tablet (81 mg total) by mouth daily. 90 tablet 1    dorzolamide-timolol 2-0.5 % Ophthalmic Solution Place 1 drop into both eyes 2 (two) times daily.      dorzolamide 2 % Ophthalmic Solution INSTILL 1 DROP IN BOTH EYES THREE TIMES DAILY AS DIRECTED      latanoprost 0.005 % Ophthalmic Solution INSTILL 1 DROP BOTH EYES EVERY DAY AT BEDTIME AS DIRECTED      Brimonidine Tartrate-Timolol 0.2-0.5 % Ophthalmic Solution Place 1 drop into both eyes every 12 (twelve) hours.       No current facility-administered medications on file prior to visit.         PHYSICAL EXAM:   General appearance: Well appearing, and in no acute distress  Skin: skin color, texture normal.  No rashes or lesions.    Head: Normocephalic, atraumatic.    Neurological exam:    Mental Status:   Attention/Concentration: intact attention on bedside test   Fund of knowledge: reduced  Speech: no dysarthria or aphasia, perseveration     Right >  left ptosis     LABS/DATA:  Myasthenia gravis labs done       IMAGING:  MRI C spine 7/2024  CONCLUSION:      Degenerative disc, facet, uncovertebral joint disease throughout the cervical spine, most prominent at C2 through C6 where there is moderate narrowing of the canal.      Severe narrowing of the bilateral neural foramen from C3 through C6.      Bony encroachment upon the exiting bilateral C4, C5 and C6 nerve roots.      Type 1 endplate degenerative changes at C2-C3 and C5-C6.      No acute fracture or vertebral body height loss.      Overall appearance is not significantly changed since 9/28/2022.   I PERSONALLY REVIEWED THESE IMAGES.     ASSESSMENT:  The patient is a 58 year old man with past medical history of left central retinal artery occlusion (discovered incidentally), essential hypertension, glaucoma, developmental delay with childhood epilepsy who presents for follow up.       -MRI brain 7/23/2021: Motion degraded; no acute intracranial process  -Carotid ultrasound 8/25/2021: No significant stenosis  -CT C spine 9/10/2021: with multilevel degenerative disc disease and right C4 nerve impingement.  Mild canal narrowing C3 C6  -CTA brain 9/10/2021 with no aneurysm; incidental diminutive right vertebral that terminates in PICA and ACOM   -Hemoglobin A1c 5.4  -  -MRI cervical spine 9/28/2022 with multilevel degenerative change and ventral cord indentation at multiple levels, had been referred to neurosurgery and spine medicine   7/2024 imaging stable   -MRI thoracic spine: Mild spondylosis with multiple levels of disc protrusions   -MRI lumbar spine with: Mild multilevel spondylotic changes  --Myasthenia gravis labs normal    Cervical spondylosis leading to scalp dysesthesias improved on Duloxetine   Somatization   -Continue Duloxetine 30 mg daily - have refilled this as he is about to run out of medication but physiatry may take over prescription again   -After discussion, they will hold off on CTA  head/neck     Follow up: as needed     Discussed indication, administration, dose, and side effects with patient of any medications personally prescribed. Patient was advised to let my office know if they have any questions or concerns.       Today, I personally spent 16 minutes in this case, including chart review, time spent with patient doing face to face evaluation w/ interview and exam and patient education, counseling, and time was spent in patient education, counseling, and coordination of care as described above.   Issues discussed: Diagnosis and implications on future health, benefits and side effects of present and future medications, test results as well as further testing and medications required.    This note was prepared using Dragon Medical voice recognition dictation software and as a result, errors may occur. When identified, these errors have been corrected. While every attempt is made to correct errors during dictation, discrepancies may still exist    CARLYN Linder DO   Staff Physician, Neurology   9/26/2024  1:49 PM

## 2024-09-30 ENCOUNTER — OFFICE VISIT (OUTPATIENT)
Dept: PHYSICAL MEDICINE AND REHAB | Facility: CLINIC | Age: 59
End: 2024-09-30
Payer: MEDICARE

## 2024-09-30 VITALS — WEIGHT: 204 LBS | HEIGHT: 66 IN | BODY MASS INDEX: 32.78 KG/M2

## 2024-09-30 DIAGNOSIS — M54.2 CERVICALGIA: ICD-10-CM

## 2024-09-30 DIAGNOSIS — M79.18 MYOFASCIAL PAIN: Primary | ICD-10-CM

## 2024-09-30 PROCEDURE — 99214 OFFICE O/P EST MOD 30 MIN: CPT | Performed by: PHYSICAL MEDICINE & REHABILITATION

## 2024-09-30 RX ORDER — DULOXETIN HYDROCHLORIDE 60 MG/1
60 CAPSULE, DELAYED RELEASE ORAL DAILY
Qty: 30 CAPSULE | Refills: 0 | Status: SHIPPED | OUTPATIENT
Start: 2024-09-30 | End: 2024-10-30

## 2024-09-30 NOTE — PROGRESS NOTES
[facility]  Progress Note    CHIEF COMPLAINT:    Chief Complaint   Patient presents with    Follow - Up     LOV 7/16/2024 Patient completed cervical MRI 7/20/24. Has been taking cymbalta 30 MG daily and thinks this has helped with the 'numbness on top of my head'. Denies new sx. LOP 0/10       History of Present Illness:  Shawn Cortez is a 58 year old male who presents today for follow up for symptoms of chronic neck pain, myofascial pain and cranial paresthesias.  At his last visit I recommended Cymbalta 30 mg.  He states he has less paresthesias in the occipital nerve distribution since then.  No side effects.  He also had a cervical MRI in the interim and saw Dr. Chen who felt he was not a surgical candidate.    PAST MEDICAL HISTORY:  Past Medical History:    Essential hypertension    Glaucoma       SURGICAL HISTORY:  No past surgical history on file.    SOCIAL HISTORY:   Social History     Occupational History    Not on file   Tobacco Use    Smoking status: Never    Smokeless tobacco: Never   Vaping Use    Vaping status: Never Used   Substance and Sexual Activity    Alcohol use: Not Currently     Alcohol/week: 2.0 - 3.0 standard drinks of alcohol     Types: 2 - 3 Cans of beer per week    Drug use: Never    Sexual activity: Not on file       CURRENT MEDICATIONS:   Current Outpatient Medications   Medication Sig Dispense Refill    DULoxetine (CYMBALTA) 60 MG Oral Cap DR Particles Take 1 capsule (60 mg total) by mouth daily. 30 capsule 0    lidocaine 5 % External Ointment -Apply thin layer to affected area up to three times daily as needed for pain/stinging/tingling. -Use gloves when you apply Lidocaine cream or gel so you do not make your hand numb. 30 g 0    amLODIPine 10 MG Oral Tab Take 1 tablet (10 mg total) by mouth daily. 90 tablet 1    aspirin (ASPIRIN LOW DOSE) 81 MG Oral Tab EC Take 1 tablet (81 mg total) by mouth daily. 90 tablet 1     dorzolamide-timolol 2-0.5 % Ophthalmic Solution Place 1 drop into both eyes 2 (two) times daily.      dorzolamide 2 % Ophthalmic Solution INSTILL 1 DROP IN BOTH EYES THREE TIMES DAILY AS DIRECTED      latanoprost 0.005 % Ophthalmic Solution INSTILL 1 DROP BOTH EYES EVERY DAY AT BEDTIME AS DIRECTED      Brimonidine Tartrate-Timolol 0.2-0.5 % Ophthalmic Solution Place 1 drop into both eyes every 12 (twelve) hours.         ALLERGIES:   No Known Allergies      PHYSICAL EXAM:   Ht 66\"   Wt 204 lb (92.5 kg)   BMI 32.93 kg/m²     Body mass index is 32.93 kg/m².      Unchanged        Data    Radiology Imaging:  I personally reviewed a cervical MRI from July 2024 showing congenital and developmental multilevel spinal canal narrowing with preserved CSF around the cord at all levels, no cord signal abnormality.    ASSESSMENT AND PLAN:  1. Myofascial pain  He has not responded to myofascial trigger point injections.  He has not responded to physical therapy.  His cervical MRI findings are not causing his symptoms.  He seems to be getting better with Cymbalta 30 mg.  I recommended increasing his Cymbalta 60 mg.  Will try that for 1 month.  If improved I would prescribe him that indefinitely.  If not improved I would continue him on 30 mg of Cymbalta indefinitely.  His sister will contact me in 4 weeks to let me know how he is responding to the new dosage of Cymbalta.    2. Cervicalgia  Nonspecific        RTC 4 weeks by telephone        The patient was in agreement with the assessment and plan.  All questions were answered.        Dilip Steele MD  Physical Medicine and Rehabilitation/Sports Medicine  [facility]

## 2024-10-28 RX ORDER — DULOXETIN HYDROCHLORIDE 60 MG/1
60 CAPSULE, DELAYED RELEASE ORAL DAILY
Qty: 30 CAPSULE | Refills: 0 | OUTPATIENT
Start: 2024-10-28

## 2024-10-28 RX ORDER — DULOXETIN HYDROCHLORIDE 30 MG/1
30 CAPSULE, DELAYED RELEASE ORAL DAILY
Qty: 90 CAPSULE | Refills: 3 | Status: SHIPPED | OUTPATIENT
Start: 2024-10-28 | End: 2025-10-23

## 2024-10-28 NOTE — TELEPHONE ENCOUNTER
Refill Request    Medication request: DULoxetine (CYMBALTA) 60 MG Oral Cap DR Particles.  Take 1 capsule (60 mg total) by mouth daily.      LOV:9/30/2024 Dilip Steele MD   Due back to clinic per last office note:  RTC 4 weeks by telephone   NOV: Visit date not found      [place]: 9/30/24 #30 (30 days)    Urine drug screen (if applicable): N/A  Pain contract: N/A    LOV plan (if weaning or changing medications): I recommended increasing his Cymbalta 60 mg. Will try that for 1 month. If improved I would prescribe him that indefinitely. If not improved I would continue him on 30 mg of Cymbalta indefinitely. His sister will contact me in 4 weeks to let me know how he is responding to the new dosage of Cymbalta.       Spoke with patient and his sister Denisse and per patient the there is no change in condition with taking 60 mg. For him 30 mg was giving the same effect.     Pended an order for the 30 mg as well as routing to Dr Steele to review.

## 2024-10-29 RX ORDER — DULOXETIN HYDROCHLORIDE 30 MG/1
30 CAPSULE, DELAYED RELEASE ORAL DAILY
Qty: 30 CAPSULE | Refills: 0 | OUTPATIENT
Start: 2024-10-29

## 2024-10-29 NOTE — TELEPHONE ENCOUNTER
Refill Request    Medication request: DULoxetine (CYMBALTA) 30 MG Oral Cap DR Particles.   Take 1 capsule (30 mg total) by mouth daily.      LOV:9/30/2024 Dilip Steele MD   Due back to clinic per last office note:     RTC 4 weeks by telephone  NOV: Visit date not found      [place]: 9/30/24 #30 (30 days)    Urine drug screen (if applicable): N/A  Pain contract: N/A    LOV plan (if weaning or changing medications): His sister will contact me in 4 weeks to let me know how he is responding to the new dosage of Cymbalta.       Please review refill request from yesterday. Medication already sent.

## 2024-11-23 DIAGNOSIS — I10 ESSENTIAL HYPERTENSION: ICD-10-CM

## 2024-11-25 RX ORDER — AMLODIPINE BESYLATE 10 MG/1
10 TABLET ORAL DAILY
Qty: 90 TABLET | Refills: 1 | Status: SHIPPED | OUTPATIENT
Start: 2024-11-25

## 2024-11-25 NOTE — TELEPHONE ENCOUNTER
A refill request was received for:  Requested Prescriptions     Pending Prescriptions Disp Refills    AMLODIPINE 10 MG Oral Tab [Pharmacy Med Name: AMLODIPINE BESYLATE 10MG TABLETS] 90 tablet 1     Sig: TAKE 1 TABLET(10 MG) BY MOUTH DAILY     Last refill date:  6/3/24  Qty: 90 and 1   Dx: HTN  Last office visit: 6/3/24   When is follow up due:  12/3/24       Future Appointments   Date Time Provider Department Center   12/2/2024 11:00 AM Fiona Fiore APRN [id number]

## 2024-12-02 ENCOUNTER — OFFICE VISIT (OUTPATIENT)
Dept: FAMILY MEDICINE CLINIC | Facility: CLINIC | Age: 59
End: 2024-12-02
Payer: MEDICARE

## 2024-12-02 VITALS
DIASTOLIC BLOOD PRESSURE: 86 MMHG | SYSTOLIC BLOOD PRESSURE: 138 MMHG | WEIGHT: 226.81 LBS | HEART RATE: 86 BPM | OXYGEN SATURATION: 94 % | BODY MASS INDEX: 36.45 KG/M2 | TEMPERATURE: 98 F | HEIGHT: 66 IN

## 2024-12-02 DIAGNOSIS — Z12.11 SCREENING FOR MALIGNANT NEOPLASM OF COLON: ICD-10-CM

## 2024-12-02 DIAGNOSIS — R97.20 ELEVATED PSA: ICD-10-CM

## 2024-12-02 DIAGNOSIS — Z28.21 INFLUENZA VACCINATION DECLINED BY PATIENT: ICD-10-CM

## 2024-12-02 DIAGNOSIS — I10 ESSENTIAL HYPERTENSION: Primary | ICD-10-CM

## 2024-12-02 NOTE — PROGRESS NOTES
Chief Complaint:   Chief Complaint   Patient presents with    Follow - Up       HPI:   This is a 58 year old male coming in for 6-month follow-up. He feels generally well. Has seen neurosurgery and determined not to be a surgical candidate. Seeing neurology and physiatry, on Cymbalta and reports some of his paresthesias have improved. Blood pressure stable. Seeing ophthalmology as well. Due to see urology for elevated PSA. Has never had a colonoscopy.    Results for orders placed or performed in visit on 07/31/24   Hemoglobin A1C    Collection Time: 07/31/24 12:58 PM   Result Value Ref Range    HgbA1C 5.5 <5.7 %    Estimated Average Glucose 111 68 - 126 mg/dL   PSA Total, Screen    Collection Time: 07/31/24 12:58 PM   Result Value Ref Range    Prostate Specific Antigen Screen  7.94 (H) <=4.00 ng/mL   Myasthenia Gravis Reflex Panel    Collection Time: 07/31/24 12:58 PM   Result Value Ref Range    AChR Binding Abs <0.03 0.00 - 0.24 nmol/L    AChR Blocking Abs 25 0 - 25 %    AChR-modulating Ab 6 0 - 45 %    Reflex Information Comment     Striational Antibodies Negative Neg:<1:100   MuSK Antibodies    Collection Time: 07/31/24 12:58 PM   Result Value Ref Range    MuSK Abs, Serum <1.0 U/mL             Past Medical History:    Essential hypertension    Glaucoma     History reviewed. No pertinent surgical history.  Social History:  Social History     Socioeconomic History    Marital status: Single   Tobacco Use    Smoking status: Never    Smokeless tobacco: Never   Vaping Use    Vaping status: Never Used   Substance and Sexual Activity    Alcohol use: Not Currently     Alcohol/week: 2.0 - 3.0 standard drinks of alcohol     Types: 2 - 3 Cans of beer per week    Drug use: Never   Other Topics Concern    Caffeine Concern Yes     Comment: 1 cup daily    Exercise Yes     Comment: walks     Family History:  Family History   Problem Relation Age of Onset    Other (Other) Mother     Hypertension Sister     Other (Other) Sister      Other (Brain Aneurysm) Sister     Other (Other) Brother     Migraines Sister      Allergies:  Allergies[1]  Current Meds:  Current Outpatient Medications   Medication Sig Dispense Refill    AMLODIPINE 10 MG Oral Tab TAKE 1 TABLET(10 MG) BY MOUTH DAILY 90 tablet 1    DULoxetine (CYMBALTA) 30 MG Oral Cap DR Particles Take 1 capsule (30 mg total) by mouth daily. 90 capsule 3    lidocaine 5 % External Ointment -Apply thin layer to affected area up to three times daily as needed for pain/stinging/tingling. -Use gloves when you apply Lidocaine cream or gel so you do not make your hand numb. 30 g 0    aspirin (ASPIRIN LOW DOSE) 81 MG Oral Tab EC Take 1 tablet (81 mg total) by mouth daily. 90 tablet 1    dorzolamide-timolol 2-0.5 % Ophthalmic Solution Place 1 drop into both eyes 2 (two) times daily.      dorzolamide 2 % Ophthalmic Solution INSTILL 1 DROP IN BOTH EYES THREE TIMES DAILY AS DIRECTED      latanoprost 0.005 % Ophthalmic Solution INSTILL 1 DROP BOTH EYES EVERY DAY AT BEDTIME AS DIRECTED      Brimonidine Tartrate-Timolol 0.2-0.5 % Ophthalmic Solution Place 1 drop into both eyes every 12 (twelve) hours.        Counseling given: No       REVIEW OF SYSTEMS:   CONSTITUTIONAL:  Denies unusual weight gain/loss, fever, chills, or fatigue.  CARDIOVASCULAR:  Denies chest pain, palpitations, edema, dyspnea on exertion or at rest.  RESPIRATORY:  Denies shortness of breath, wheezing, cough or sputum.  NEUROLOGICAL:  Denies headache, seizures, dizziness.    EXAM:   /86   Pulse 86   Temp 98.2 °F (36.8 °C)   Ht 5' 6\" (1.676 m)   Wt 226 lb 12.8 oz (102.9 kg)   SpO2 94%   BMI 36.61 kg/m²  Estimated body mass index is 36.61 kg/m² as calculated from the following:    Height as of this encounter: 5' 6\" (1.676 m).    Weight as of this encounter: 226 lb 12.8 oz (102.9 kg).   Vital signs reviewed.Appears stated age, well groomed, in no acute distress.  Physical Exam:  GEN:  Patient is alert, awake and oriented, well  developed, well nourished.  SKIN: No rashes, no skin lesion, no bruising, good turgor.  HEART:  Regular rate and rhythm, no murmurs, rubs or gallops.  LUNGS: Clear to auscultation bilterally, no rales/rhonchi/wheezing.  EXTREMITIES:  No edema.  NEURO:  No deficit, normal gait, strength and tone, sensory intact.    ASSESSMENT AND PLAN:   1. Essential hypertension  -Stable, CPM. Follow-up 6 months.    2. Elevated PSA  -Referred again to urology for follow-up.  - Urology Referral - [place] ([facility])    3. Influenza vaccination declined by patient  -Declines all vaccinations today.    4. Screening for malignant neoplasm of colon  -Referred to GI.  - Gastro Referral - In [facility]      Meds & Refills for this Visit:  Requested Prescriptions      No prescriptions requested or ordered in this encounter         Problem List:  Patient Active Problem List   Diagnosis    Essential hypertension    Cervicalgia    Glaucoma of both eyes    Elevated PSA    Myofascial pain    Numbness and tingling    Retinal artery occlusion    Chronic midline low back pain with bilateral sciatica    Abdominal pain       Fiona Fiore, APRN  12/2/2024  11:14 AM         [1] No Known Allergies

## 2024-12-30 RX ORDER — DULOXETIN HYDROCHLORIDE 30 MG/1
30 CAPSULE, DELAYED RELEASE ORAL DAILY
Qty: 90 CAPSULE | Refills: 3 | Status: SHIPPED | OUTPATIENT
Start: 2024-12-30

## 2024-12-30 NOTE — TELEPHONE ENCOUNTER
The patient is requesting a refill on:  DULOXETINE DR 30MG CAPSULES     [place] 9[place]30/24 (#30/0)    Last OV: 9/26/24  Next OV: none schedule  Future Appointments   Date Time Provider Department Center   6/2/2025  1:00 PM Fiona Fiore APRN [id number] [facility]     Established  Follow Up Visit         Date: September 26, 2024  Patient Name: Shawn Cortez   MRN: [id number]  Primary physician: [address] [facility] [place] [address]     Interval History:   --Does feel improvement of his paresthesias in Duloxetine 30 mg daily.  Feels like his ptosis is the same.  Feeling overall much better.          OUTPATIENT MEDICATIONS  Medications Ordered Prior to Encounter          Current Outpatient Medications on File Prior to Visit   Medication Sig Dispense Refill    DULoxetine (CYMBALTA) 30 MG Oral Cap DR Particles Take 1 capsule (30 mg total) by mouth daily. 30 capsule 0    lidocaine 5 % External Ointment -Apply thin layer to affected area up to three times daily as needed for pain/stinging/tingling. -Use gloves when you apply Lidocaine cream or gel so you do not make your hand numb. 30 g 0    amLODIPine 10 MG Oral Tab Take 1 tablet (10 mg total) by mouth daily. 90 tablet 1    aspirin (ASPIRIN LOW DOSE) 81 MG Oral Tab EC Take 1 tablet (81 mg total) by mouth daily. 90 tablet 1    dorzolamide-timolol 2-0.5 % Ophthalmic Solution Place 1 drop into both eyes 2 (two) times daily.        dorzolamide 2 % Ophthalmic Solution INSTILL 1 DROP IN BOTH EYES THREE TIMES DAILY AS DIRECTED        latanoprost 0.005 % Ophthalmic Solution INSTILL 1 DROP BOTH EYES EVERY DAY AT BEDTIME AS DIRECTED        Brimonidine Tartrate-Timolol 0.2-0.5 % Ophthalmic Solution Place 1 drop into both eyes every 12 (twelve) hours.          No current facility-administered medications on file prior to visit.               PHYSICAL EXAM:           General appearance: Well appearing, and in no acute distress  Skin:  skin color, texture normal.  No rashes or lesions.    Head: Normocephalic, atraumatic.     Neurological exam:     Mental Status:   Attention/Concentration: intact attention on bedside test   Fund of knowledge: reduced  Speech: no dysarthria or aphasia, perseveration      Right > left ptosis      LABS/DATA:  Myasthenia gravis labs done         IMAGING:  MRI C spine 7/2024  CONCLUSION:      Degenerative disc, facet, uncovertebral joint disease throughout the cervical spine, most prominent at C2 through C6 where there is moderate narrowing of the canal.      Severe narrowing of the bilateral neural foramen from C3 through C6.      Bony encroachment upon the exiting bilateral C4, C5 and C6 nerve roots.      Type 1 endplate degenerative changes at C2-C3 and C5-C6.      No acute fracture or vertebral body height loss.      Overall appearance is not significantly changed since 9/28/2022.   I PERSONALLY REVIEWED THESE IMAGES.      ASSESSMENT:  The patient is a 58 year old man with past medical history of left central retinal artery occlusion (discovered incidentally), essential hypertension, glaucoma, developmental delay with childhood epilepsy who presents for follow up.       -MRI brain 7/23/2021: Motion degraded; no acute intracranial process  -Carotid ultrasound 8/25/2021: No significant stenosis  -CT C spine 9/10/2021: with multilevel degenerative disc disease and right C4 nerve impingement.  Mild canal narrowing C3 C6  -CTA brain 9/10/2021 with no aneurysm; incidental diminutive right vertebral that terminates in PICA and ACOM   -Hemoglobin A1c 5.4  -  -MRI cervical spine 9/28/2022 with multilevel degenerative change and ventral cord indentation at multiple levels, had been referred to neurosurgery and spine medicine               7/2024 imaging stable   -MRI thoracic spine: Mild spondylosis with multiple levels of disc protrusions   -MRI lumbar spine with: Mild multilevel spondylotic changes  --Myasthenia  gravis labs normal     Cervical spondylosis leading to scalp dysesthesias improved on Duloxetine   Somatization   -Continue Duloxetine 30 mg daily - have refilled this as he is about to run out of medication but physiatry may take over prescription again   -After discussion, they will hold off on CTA head/neck      Follow up: as needed      Discussed indication, administration, dose, and side effects with patient of any medications personally prescribed. Patient was advised to let my office know if they have any questions or concerns.         Today, I personally spent 16 minutes in this case, including chart review, time spent with patient doing face to face evaluation w/ interview and exam and patient education, counseling, and time was spent in patient education, counseling, and coordination of care as described above.   Issues discussed: Diagnosis and implications on future health, benefits and side effects of present and future medications, test results as well as further testing and medications required.     This note was prepared using Dragon Medical voice recognition dictation software and as a result, errors may occur. When identified, these errors have been corrected. While every attempt is made to correct errors during dictation, discrepancies may still exist     CARLYN Linder DO   Staff Physician, Neurology   9/26/2024  1:49 PM                    Instructions      Return if symptoms worsen or fail to improve

## 2025-02-04 ENCOUNTER — TELEPHONE (OUTPATIENT)
Dept: FAMILY MEDICINE CLINIC | Facility: CLINIC | Age: 60
End: 2025-02-04

## 2025-02-04 DIAGNOSIS — H40.9 GLAUCOMA OF BOTH EYES, UNSPECIFIED GLAUCOMA TYPE: Primary | ICD-10-CM

## 2025-02-04 NOTE — TELEPHONE ENCOUNTER
Patients sister Denisse Van called to request a referral be faxed to his eye doctor for an appointment Feb. 11, 2025 @ 2:45pm. Fax # [phone] to the attention of Dr. Tsang. She would like a call confirming it was faxed,. Her phone number is  [phone]

## 2025-02-05 NOTE — TELEPHONE ENCOUNTER
Message sent to [facility] for referral authorization assistance.

## 2025-02-26 NOTE — TELEPHONE ENCOUNTER
----- Message from Alecia Monahan MD sent at 10/12/2022  4:17 PM CDT -----  Urology staff,This patient has a caretaker, who is his brother who came with him at his last office visit in August.  Plan had been to obtain a urinalysis. I prescribed ciprofloxacin course for presumed urethritis/prostatitis and asked him to return in 4 weeks with a PSA 1 to 2 days prior to the visit but he obtained a PSA on the day of the visit. Please make sure the patient has an appointment to see me in the next 2 to 3 weeks. Nursing staff may order another PSA to be done 1 to 2 days prior to the appointment. Explained to the brother that elevated PSA may be an early sign of prostate cancer and that this abnormality needs to be followed and further evaluated if it persists. Patient with occasional L>R hip pain that is worsened with prolonged sitting. Patient has had this for sometime, had prior xray that showed osteolytic lesion but when re-evaluated with MRI, no osteolytic lesion was seen in 2021.    Recommended PT for ongoing hp and knee pain, heat/ice, voltaren/lidocaine gel, and tylenol as needed.   Can consider injections as well if persists.   Will order repeat Xray given persistence

## 2025-05-22 DIAGNOSIS — I10 ESSENTIAL HYPERTENSION: ICD-10-CM

## 2025-05-22 RX ORDER — AMLODIPINE BESYLATE 10 MG/1
10 TABLET ORAL DAILY
Qty: 90 TABLET | Refills: 1 | Status: SHIPPED | OUTPATIENT
Start: 2025-05-22

## 2025-06-02 ENCOUNTER — OFFICE VISIT (OUTPATIENT)
Dept: FAMILY MEDICINE CLINIC | Facility: CLINIC | Age: 60
End: 2025-06-02
Payer: MEDICARE

## 2025-06-02 VITALS
DIASTOLIC BLOOD PRESSURE: 74 MMHG | HEART RATE: 80 BPM | BODY MASS INDEX: 36.48 KG/M2 | OXYGEN SATURATION: 95 % | WEIGHT: 227 LBS | TEMPERATURE: 98 F | SYSTOLIC BLOOD PRESSURE: 118 MMHG | HEIGHT: 66 IN

## 2025-06-02 DIAGNOSIS — Z00.00 ADULT GENERAL MEDICAL EXAMINATION: ICD-10-CM

## 2025-06-02 DIAGNOSIS — I10 ESSENTIAL HYPERTENSION: Primary | ICD-10-CM

## 2025-06-02 DIAGNOSIS — R97.20 ELEVATED PSA: ICD-10-CM

## 2025-06-02 NOTE — PROGRESS NOTES
Chief Complaint:   Chief Complaint   Patient presents with    Follow - Up     Hypertension       HPI:   This is a 59 year old male coming in for follow-up. Hx HTN, well-controlled. Also sees ophthalmology, neurology, and urology. Feels well.     Results for orders placed or performed in visit on 07/31/24   Hemoglobin A1C    Collection Time: 07/31/24 12:58 PM   Result Value Ref Range    HgbA1C 5.5 <5.7 %    Estimated Average Glucose 111 68 - 126 mg/dL   PSA Total, Screen    Collection Time: 07/31/24 12:58 PM   Result Value Ref Range    Prostate Specific Antigen Screen  7.94 (H) <=4.00 ng/mL   Myasthenia Gravis Reflex Panel    Collection Time: 07/31/24 12:58 PM   Result Value Ref Range    AChR Binding Abs <0.03 0.00 - 0.24 nmol/L    AChR Blocking Abs 25 0 - 25 %    AChR-modulating Ab 6 0 - 45 %    Reflex Information Comment     Striational Antibodies Negative Neg:<1:100   MuSK Antibodies    Collection Time: 07/31/24 12:58 PM   Result Value Ref Range    MuSK Abs, Serum <1.0 U/mL             Past Medical History[1]  Past Surgical History[2]  Social History:  Short Social Hx on File[3]  Family History:  Family History[4]  Allergies:  Allergies[5]  Current Meds:  Current Medications[6]   Counseling given: Not Answered       REVIEW OF SYSTEMS:   CONSTITUTIONAL:  Denies unusual weight gain/loss, fever, chills, or fatigue.  CARDIOVASCULAR:  Denies chest pain, palpitations, edema, dyspnea on exertion or at rest.  RESPIRATORY:  Denies shortness of breath, wheezing, cough or sputum.  MUSCULOSKELETAL:  +Arthritis in neck.  NEUROLOGICAL:  Denies headache, seizures, dizziness.    EXAM:   /74 (BP Location: Left arm, Patient Position: Sitting, Cuff Size: large)   Pulse 80   Temp 97.9 °F (36.6 °C) (Temporal)   Ht 5' 6\" (1.676 m)   Wt 227 lb (103 kg)   SpO2 95%   BMI 36.64 kg/m²  Estimated body mass index is 36.64 kg/m² as calculated from the following:    Height as of this encounter: 5' 6\" (1.676 m).    Weight as of this  encounter: 227 lb (103 kg).   Vital signs reviewed.Appears stated age, well groomed, in no acute distress.  Physical Exam:  GEN:  Patient is alert, awake and oriented, well developed, well nourished.  HEART:  Regular rate and rhythm, no murmurs, rubs or gallops.  LUNGS: Clear to auscultation bilterally, no rales/rhonchi/wheezing.  EXTREMITIES:  No edema.  NEURO:  No deficit, normal gait, strength and tone, sensory intact.    ASSESSMENT AND PLAN:   1. Essential hypertension  -BP at goal, CPM. Follow-up 6 months.  - CBC With Differential With Platelet  - Comp Metabolic Panel (14)    2. Elevated PSA  -Will recheck 8/2025.  - PSA Total, Screen; Future    3. Adult general medical examination  -Due in August 2025.  - CBC With Differential With Platelet  - Comp Metabolic Panel (14)  - Hemoglobin A1C; Future  - Lipid Panel  - PSA Total, Screen; Future  - Assay, Thyroid Stim Hormone      Meds & Refills for this Visit:  Requested Prescriptions      No prescriptions requested or ordered in this encounter         Problem List:  Problem List[7]    Fiona Fiore, DIANA  6/2/2025  1:08 PM         [1]   Past Medical History:   Essential hypertension    Glaucoma   [2] History reviewed. No pertinent surgical history.  [3]   Social History  Socioeconomic History    Marital status: Single   Tobacco Use    Smoking status: Never     Passive exposure: Never    Smokeless tobacco: Never   Vaping Use    Vaping status: Never Used   Substance and Sexual Activity    Alcohol use: Not Currently     Alcohol/week: 2.0 - 3.0 standard drinks of alcohol     Types: 2 - 3 Cans of beer per week    Drug use: Never   Other Topics Concern    Caffeine Concern No     Comment: 1 cup daily    Exercise Yes     Comment: walks    Seat Belt Yes    Special Diet No    Stress Concern No    Weight Concern No     Social Drivers of Health     Food Insecurity: No Food Insecurity (6/2/2025)    NCSS - Food Insecurity     Worried About Running Out of Food in the Last  Year: No     Ran Out of Food in the Last Year: No   Transportation Needs: No Transportation Needs (6/2/2025)    NCSS - Transportation     Lack of Transportation: No   Housing Stability: Not At Risk (6/2/2025)    NCSS - Housing/Utilities     Has Housing: Yes     Worried About Losing Housing: No     Unable to Get Utilities: No   [4]   Family History  Problem Relation Age of Onset    Other (Other) Mother     Hypertension Sister     Other (Other) Sister     Other (Brain Aneurysm) Sister     Other (Other) Brother     Migraines Sister    [5] No Known Allergies  [6]   Current Outpatient Medications   Medication Sig Dispense Refill    AMLODIPINE 10 MG Oral Tab TAKE 1 TABLET(10 MG) BY MOUTH DAILY 90 tablet 1    aspirin (ASPIRIN LOW DOSE) 81 MG Oral Tab EC Take 1 tablet (81 mg total) by mouth daily. 90 tablet 1    dorzolamide-timolol 2-0.5 % Ophthalmic Solution Place 1 drop into both eyes 2 (two) times daily.      dorzolamide 2 % Ophthalmic Solution INSTILL 1 DROP IN BOTH EYES THREE TIMES DAILY AS DIRECTED      latanoprost 0.005 % Ophthalmic Solution INSTILL 1 DROP BOTH EYES EVERY DAY AT BEDTIME AS DIRECTED      Brimonidine Tartrate-Timolol 0.2-0.5 % Ophthalmic Solution Place 1 drop into both eyes every 12 (twelve) hours.     [7]   Patient Active Problem List  Diagnosis    Essential hypertension    Cervicalgia    Glaucoma of both eyes    Elevated PSA    Myofascial pain    Numbness and tingling    Retinal artery occlusion    Chronic midline low back pain with bilateral sciatica    Abdominal pain

## (undated) NOTE — Clinical Note
Dear Hodan Tan,    Thank you for sending Russel Garzon to see me for physiatry consultation. I appreciate your confidence in me to care for your patients. Please feel free call me with any questions at [phone] or contact me through [facility]     Sincerely,  Virginia Byrnes

## (undated) NOTE — LETTER
[address], [facility]   Date:   10/5/2022     Name:   Malena Ramirez    YOB: 1965   MRN:   [id number]       WHERE IS YOUR PAIN NOW? Kathrin the areas on your body where you feel the described sensations. Use the appropriate symbol. Anice Cedar the areas of radiation. Include all affected areas. Just to complete the picture, please draw in the face. ACHE:  ^ ^ ^   NUMBNESS:  0000   PINS & NEEDLES:  = = = =                              ^ ^ ^                       0000              = = = =                                    ^ ^ ^                       0000            = = = =      BURNING:  XXXX   STABBING: ////                  XXXX                ////                         XXXX          ////     Please kathrin the line below indicating your degree of pain right now  with 0 being no pain 10 being the worst pain possible.                                          0             1             2              3             4              5              6              7             8             9             10         Patient Signature:

## (undated) NOTE — LETTER
WHERE IS YOUR PAIN NOW?  Kathrin the areas on your body where you feel the described sensations.  Use the appropriate symbol.  Kathrin the areas of radiation.  Include all affected areas.  Just to complete the picture, please draw in the face.     ACHE:  ^ ^ ^   NUMBNESS:  0000   PINS & NEEDLES:  = = = =                              ^ ^ ^                       0000              = = = =                                    ^ ^ ^                       0000            = = = =      BURNING:  XXXX   STABBING: ////                  XXXX                ////                         XXXX          ////     Please kathrin the line below indicating your degree of pain right now  with 0 being no pain 10 being the worst pain possible.                                         0             1             2              3             4              5              6              7             8             9             10         Patient Signature:

## (undated) NOTE — LETTER
Patient Name: Toya Obando  YOB: 1965          MRN :  [id number]  Date:  11/23/2021  Referring Physician:  Tessie Gould  Pt has attended 8 visits in Physical Therapy.      Dx: Cervicalgia (M54.2)         Insurance (Au overflow. Poor joint mobility through upper thoracic spine, low cervical spine. Restricted gapping to OA and mid cervical spine.        Assessment:  Difficult to quantify changes in QOL and function, as well as changes in symptom presentation regarding decreased pain  . Still restricted  · Pt will report decreased frequency of headaches to <3x/week. Not met  · Pt will demonstrate improved cervical intrinsic strength to allow improved cervical stabilization with overhead reaching .  poor  · Pt will improve

## (undated) NOTE — LETTER
[address], [facility]   Date:   12/6/2022     Name:   Genita Rubinstein    YOB: 1965   MRN:   [id number]       WHERE IS YOUR PAIN NOW? Rick the areas on your body where you feel the described sensations. Use the appropriate symbol. Linda Pittmanck the areas of radiation. Include all affected areas. Just to complete the picture, please draw in the face. ACHE:  ^ ^ ^   NUMBNESS:  0000   PINS & NEEDLES:  = = = =                              ^ ^ ^                       0000              = = = =                                    ^ ^ ^                       0000            = = = =      BURNING:  XXXX   STABBING: ////                  XXXX                ////                         XXXX          ////     Please rick the line below indicating your degree of pain right now  with 0 being no pain 10 being the worst pain possible.                                          0             1             2              3             4              5              6              7             8             9             10         Patient Signature:

## (undated) NOTE — LETTER
[facility] [address] , [place]   Date:   9/12/2023     Name:   Aldo Hernandez    YOB: 1965   MRN:   [id number]       WHERE IS YOUR PAIN NOW? Kathrin the areas on your body where you feel the described sensations. Use the appropriate symbol. Carlitos Calle the areas of radiation. Include all affected areas. Just to complete the picture, please draw in the face. ACHE:  ^ ^ ^   NUMBNESS:  0000   PINS & NEEDLES:  = = = =                              ^ ^ ^                       0000              = = = =                                    ^ ^ ^                       0000            = = = =      BURNING:  XXXX   STABBING: ////                  XXXX                ////                         XXXX          ////     Please kathrin the line below indicating your degree of pain right now  with 0 being no pain 10 being the worst pain possible.                                          0             1             2              3             4              5              6              7             8             9             10         Patient Signature:

## (undated) NOTE — LETTER
[address], [facility]   Date:   12/21/2021     Name:   Chandrakant Grijalva    YOB: 1965   MRN:   [id number]       WHERE IS YOUR PAIN NOW?   Rick the areas on your body where you feel the descr

## (undated) NOTE — LETTER
AUTHORIZATION FOR SURGICAL OPERATION OR OTHER PROCEDURE    1. I hereby authorize Dr. Sury Andrade and the [facility] staff assigned to my case to perform the following operation and/or procedure at the [facility]:    Trigger point injection    2. My physician has explained the nature and purpose of the operation or other procedure, possible alternative methods of treatment, the risks involved, and the possibility of complication to me. I acknowledge that no guarantee has been made as to the result that may be obtained. 3.  I recognize that, during the course of this operation, or other procedure, unforseen conditions may necessitate additional or different procedure than those listed above. I, therefore, further authorize and request that the above named physician, his/her physician assistants or designees perform such procedures as are, in his/her professional opinion, necessary and desirable. 4.  Any tissue or organs removed in the operation or other procedure may be disposed of by and at the discretion of the [facility] staff and [facility]. 5.  I understand that in the event of a medical emergency, I will be transported by local paramedics to [facility] or other hospital emergency department. 6.  I certify that I have read and fully understand the above consent to operation and/or other procedure. 7.  I acknowledge that my physician has explained sedation/analgesia administration to me including the risks and benefits. I consent to the administration of sedation/analgesia as may be necessary or desirable in the judgement of my physician. Witness signature: ___________________________________________________ Date:  ______/______/_____                    Time:  ________ A. M.  P.M.        Patient Amol Pineda  12/12/1965  [id number]         Patient signature:  ___________________________________________________        Statement of Physician  My signature below affirms that prior to the time of the procedure, I have explained to the patient and/or his/her guardian, the risks and benefits involved in the proposed treatment and any reasonable alternative to the proposed treatment. I have also explained the risks and benefits involved in the refusal of the proposed treatment and have answered the patient's questions.                         Date:  ______/______/_______  Provider                      Signature:  __________________________________________________________       Time:  ___________ AEsperanzaM    P.M.